# Patient Record
Sex: MALE | Race: WHITE | NOT HISPANIC OR LATINO | Employment: OTHER | URBAN - METROPOLITAN AREA
[De-identification: names, ages, dates, MRNs, and addresses within clinical notes are randomized per-mention and may not be internally consistent; named-entity substitution may affect disease eponyms.]

---

## 2019-03-09 ENCOUNTER — HOSPITAL ENCOUNTER (EMERGENCY)
Facility: HOSPITAL | Age: 44
End: 2019-03-10
Attending: EMERGENCY MEDICINE | Admitting: EMERGENCY MEDICINE

## 2019-03-09 VITALS
DIASTOLIC BLOOD PRESSURE: 107 MMHG | WEIGHT: 294.5 LBS | TEMPERATURE: 97.7 F | OXYGEN SATURATION: 98 % | SYSTOLIC BLOOD PRESSURE: 165 MMHG | HEIGHT: 72 IN | RESPIRATION RATE: 18 BRPM | HEART RATE: 74 BPM | BODY MASS INDEX: 39.89 KG/M2

## 2019-03-09 DIAGNOSIS — R45.851 SUICIDAL IDEATION: Primary | ICD-10-CM

## 2019-03-09 DIAGNOSIS — F41.9 ANXIETY: ICD-10-CM

## 2019-03-09 LAB
ALBUMIN SERPL-MCNC: 4.4 G/DL (ref 3.5–5.2)
ALBUMIN/GLOB SERPL: 1.5 G/DL
ALP SERPL-CCNC: 77 U/L (ref 39–117)
ALT SERPL W P-5'-P-CCNC: 32 U/L (ref 1–41)
AMPHET+METHAMPHET UR QL: NEGATIVE
ANION GAP SERPL CALCULATED.3IONS-SCNC: 13.7 MMOL/L
AST SERPL-CCNC: 25 U/L (ref 1–40)
BARBITURATES UR QL SCN: NEGATIVE
BASOPHILS # BLD AUTO: 0.05 10*3/MM3 (ref 0–0.2)
BASOPHILS NFR BLD AUTO: 0.4 % (ref 0–1.5)
BENZODIAZ UR QL SCN: NEGATIVE
BILIRUB SERPL-MCNC: 0.3 MG/DL (ref 0.1–1.2)
BUN BLD-MCNC: 6 MG/DL (ref 6–20)
BUN/CREAT SERPL: 9 (ref 7–25)
CALCIUM SPEC-SCNC: 9.7 MG/DL (ref 8.6–10.5)
CANNABINOIDS SERPL QL: NEGATIVE
CHLORIDE SERPL-SCNC: 101 MMOL/L (ref 98–107)
CO2 SERPL-SCNC: 23.3 MMOL/L (ref 22–29)
COCAINE UR QL: NEGATIVE
CREAT BLD-MCNC: 0.67 MG/DL (ref 0.76–1.27)
DEPRECATED RDW RBC AUTO: 40.5 FL (ref 37–54)
EOSINOPHIL # BLD AUTO: 0.04 10*3/MM3 (ref 0–0.4)
EOSINOPHIL NFR BLD AUTO: 0.4 % (ref 0.3–6.2)
ERYTHROCYTE [DISTWIDTH] IN BLOOD BY AUTOMATED COUNT: 12.7 % (ref 12.3–15.4)
ETHANOL BLD-MCNC: <10 MG/DL (ref 0–10)
ETHANOL UR QL: <0.01 %
GFR SERPL CREATININE-BSD FRML MDRD: 128 ML/MIN/1.73
GLOBULIN UR ELPH-MCNC: 3 GM/DL
GLUCOSE BLD-MCNC: 109 MG/DL (ref 65–99)
HCT VFR BLD AUTO: 40.2 % (ref 37.5–51)
HGB BLD-MCNC: 13.9 G/DL (ref 13–17.7)
IMM GRANULOCYTES # BLD AUTO: 0.03 10*3/MM3 (ref 0–0.05)
IMM GRANULOCYTES NFR BLD AUTO: 0.3 % (ref 0–0.5)
LYMPHOCYTES # BLD AUTO: 2.77 10*3/MM3 (ref 0.7–3.1)
LYMPHOCYTES NFR BLD AUTO: 24.4 % (ref 19.6–45.3)
MCH RBC QN AUTO: 30.3 PG (ref 26.6–33)
MCHC RBC AUTO-ENTMCNC: 34.6 G/DL (ref 31.5–35.7)
MCV RBC AUTO: 87.8 FL (ref 79–97)
METHADONE UR QL SCN: NEGATIVE
MONOCYTES # BLD AUTO: 0.94 10*3/MM3 (ref 0.1–0.9)
MONOCYTES NFR BLD AUTO: 8.3 % (ref 5–12)
NEUTROPHILS # BLD AUTO: 7.53 10*3/MM3 (ref 1.4–7)
NEUTROPHILS NFR BLD AUTO: 66.2 % (ref 42.7–76)
NRBC BLD AUTO-RTO: 0 /100 WBC (ref 0–0)
OPIATES UR QL: NEGATIVE
OXYCODONE UR QL SCN: NEGATIVE
PLATELET # BLD AUTO: 252 10*3/MM3 (ref 140–450)
PMV BLD AUTO: 9.5 FL (ref 6–12)
POTASSIUM BLD-SCNC: 3.4 MMOL/L (ref 3.5–5.2)
PROT SERPL-MCNC: 7.4 G/DL (ref 6–8.5)
RBC # BLD AUTO: 4.58 10*6/MM3 (ref 4.14–5.8)
SODIUM BLD-SCNC: 138 MMOL/L (ref 136–145)
WBC NRBC COR # BLD: 11.36 10*3/MM3 (ref 3.4–10.8)

## 2019-03-09 PROCEDURE — 84443 ASSAY THYROID STIM HORMONE: CPT | Performed by: SPECIALIST

## 2019-03-09 PROCEDURE — 81003 URINALYSIS AUTO W/O SCOPE: CPT | Performed by: SPECIALIST

## 2019-03-09 PROCEDURE — 99285 EMERGENCY DEPT VISIT HI MDM: CPT

## 2019-03-09 PROCEDURE — 80307 DRUG TEST PRSMV CHEM ANLYZR: CPT | Performed by: PHYSICIAN ASSISTANT

## 2019-03-09 PROCEDURE — 84439 ASSAY OF FREE THYROXINE: CPT | Performed by: SPECIALIST

## 2019-03-09 PROCEDURE — 85025 COMPLETE CBC W/AUTO DIFF WBC: CPT | Performed by: PHYSICIAN ASSISTANT

## 2019-03-09 PROCEDURE — 80053 COMPREHEN METABOLIC PANEL: CPT | Performed by: PHYSICIAN ASSISTANT

## 2019-03-09 RX ORDER — RISPERIDONE 1 MG/1
1 TABLET ORAL DAILY
COMMUNITY
End: 2019-03-15 | Stop reason: HOSPADM

## 2019-03-10 ENCOUNTER — HOSPITAL ENCOUNTER (INPATIENT)
Facility: HOSPITAL | Age: 44
LOS: 5 days | Discharge: HOME | End: 2019-03-15
Attending: SPECIALIST | Admitting: SPECIALIST

## 2019-03-10 PROBLEM — F33.1 MAJOR DEPRESSIVE DISORDER, RECURRENT, MODERATE (HCC): Status: ACTIVE | Noted: 2019-03-10

## 2019-03-10 LAB
ALBUMIN SERPL-MCNC: 4.3 G/DL (ref 3.5–5.2)
ALBUMIN/GLOB SERPL: 1.5 G/DL
ALP SERPL-CCNC: 74 U/L (ref 39–117)
ALT SERPL W P-5'-P-CCNC: 30 U/L (ref 1–41)
ANION GAP SERPL CALCULATED.3IONS-SCNC: 11.1 MMOL/L
AST SERPL-CCNC: 22 U/L (ref 1–40)
BASOPHILS # BLD AUTO: 0.05 10*3/MM3 (ref 0–0.2)
BASOPHILS NFR BLD AUTO: 0.5 % (ref 0–1.5)
BILIRUB SERPL-MCNC: 0.3 MG/DL (ref 0.1–1.2)
BILIRUB UR QL STRIP: NEGATIVE
BUN BLD-MCNC: 7 MG/DL (ref 6–20)
BUN/CREAT SERPL: 9.9 (ref 7–25)
CALCIUM SPEC-SCNC: 9.7 MG/DL (ref 8.6–10.5)
CHLORIDE SERPL-SCNC: 101 MMOL/L (ref 98–107)
CLARITY UR: ABNORMAL
CO2 SERPL-SCNC: 28.9 MMOL/L (ref 22–29)
COLOR UR: YELLOW
CREAT BLD-MCNC: 0.71 MG/DL (ref 0.76–1.27)
DEPRECATED RDW RBC AUTO: 41.3 FL (ref 37–54)
EOSINOPHIL # BLD AUTO: 0.06 10*3/MM3 (ref 0–0.4)
EOSINOPHIL NFR BLD AUTO: 0.6 % (ref 0.3–6.2)
ERYTHROCYTE [DISTWIDTH] IN BLOOD BY AUTOMATED COUNT: 12.7 % (ref 12.3–15.4)
GFR SERPL CREATININE-BSD FRML MDRD: 120 ML/MIN/1.73
GLOBULIN UR ELPH-MCNC: 2.8 GM/DL
GLUCOSE BLD-MCNC: 104 MG/DL (ref 65–99)
GLUCOSE UR STRIP-MCNC: NEGATIVE MG/DL
HCT VFR BLD AUTO: 40.2 % (ref 37.5–51)
HGB BLD-MCNC: 13.4 G/DL (ref 13–17.7)
HGB UR QL STRIP.AUTO: NEGATIVE
IMM GRANULOCYTES # BLD AUTO: 0.08 10*3/MM3 (ref 0–0.05)
IMM GRANULOCYTES NFR BLD AUTO: 0.8 % (ref 0–0.5)
KETONES UR QL STRIP: ABNORMAL
LEUKOCYTE ESTERASE UR QL STRIP.AUTO: NEGATIVE
LYMPHOCYTES # BLD AUTO: 2.91 10*3/MM3 (ref 0.7–3.1)
LYMPHOCYTES NFR BLD AUTO: 27.6 % (ref 19.6–45.3)
MCH RBC QN AUTO: 29.7 PG (ref 26.6–33)
MCHC RBC AUTO-ENTMCNC: 33.3 G/DL (ref 31.5–35.7)
MCV RBC AUTO: 89.1 FL (ref 79–97)
MONOCYTES # BLD AUTO: 1.07 10*3/MM3 (ref 0.1–0.9)
MONOCYTES NFR BLD AUTO: 10.2 % (ref 5–12)
NEUTROPHILS # BLD AUTO: 6.36 10*3/MM3 (ref 1.4–7)
NEUTROPHILS NFR BLD AUTO: 60.3 % (ref 42.7–76)
NITRITE UR QL STRIP: NEGATIVE
NRBC BLD AUTO-RTO: 0 /100 WBC (ref 0–0)
PH UR STRIP.AUTO: 5.5 [PH] (ref 5–8)
PLATELET # BLD AUTO: 256 10*3/MM3 (ref 140–450)
PMV BLD AUTO: 9.6 FL (ref 6–12)
POTASSIUM BLD-SCNC: 3.6 MMOL/L (ref 3.5–5.2)
PROT SERPL-MCNC: 7.1 G/DL (ref 6–8.5)
PROT UR QL STRIP: ABNORMAL
RBC # BLD AUTO: 4.51 10*6/MM3 (ref 4.14–5.8)
SODIUM BLD-SCNC: 141 MMOL/L (ref 136–145)
SP GR UR STRIP: 1.01 (ref 1–1.03)
T4 FREE SERPL-MCNC: 1.49 NG/DL (ref 0.93–1.7)
TSH SERPL DL<=0.05 MIU/L-ACNC: 1.77 MIU/ML (ref 0.27–4.2)
UROBILINOGEN UR QL STRIP: ABNORMAL
WBC NRBC COR # BLD: 10.53 10*3/MM3 (ref 3.4–10.8)

## 2019-03-10 PROCEDURE — 80053 COMPREHEN METABOLIC PANEL: CPT | Performed by: SPECIALIST

## 2019-03-10 PROCEDURE — 85025 COMPLETE CBC W/AUTO DIFF WBC: CPT | Performed by: SPECIALIST

## 2019-03-10 RX ORDER — RISPERIDONE 1 MG/1
1 TABLET ORAL DAILY
Status: DISCONTINUED | OUTPATIENT
Start: 2019-03-10 | End: 2019-03-10

## 2019-03-10 RX ORDER — THIAMINE MONONITRATE (VIT B1) 100 MG
100 TABLET ORAL DAILY
Status: DISCONTINUED | OUTPATIENT
Start: 2019-03-10 | End: 2019-03-15 | Stop reason: HOSPADM

## 2019-03-10 RX ORDER — NICOTINE 21 MG/24HR
1 PATCH, TRANSDERMAL 24 HOURS TRANSDERMAL EVERY 24 HOURS
Status: DISCONTINUED | OUTPATIENT
Start: 2019-03-10 | End: 2019-03-10

## 2019-03-10 RX ORDER — ALUMINA, MAGNESIA, AND SIMETHICONE 2400; 2400; 240 MG/30ML; MG/30ML; MG/30ML
15 SUSPENSION ORAL EVERY 6 HOURS PRN
Status: DISCONTINUED | OUTPATIENT
Start: 2019-03-10 | End: 2019-03-15 | Stop reason: HOSPADM

## 2019-03-10 RX ORDER — LAMOTRIGINE 25 MG/1
25 TABLET ORAL DAILY
Status: DISCONTINUED | OUTPATIENT
Start: 2019-03-10 | End: 2019-03-14

## 2019-03-10 RX ORDER — PANTOPRAZOLE SODIUM 40 MG/1
40 TABLET, DELAYED RELEASE ORAL
Status: DISCONTINUED | OUTPATIENT
Start: 2019-03-11 | End: 2019-03-15 | Stop reason: HOSPADM

## 2019-03-10 RX ORDER — OLANZAPINE 5 MG/1
5 TABLET ORAL 3 TIMES DAILY
Status: DISCONTINUED | OUTPATIENT
Start: 2019-03-10 | End: 2019-03-10

## 2019-03-10 RX ORDER — HYDROXYZINE PAMOATE 25 MG/1
50 CAPSULE ORAL EVERY 6 HOURS PRN
Status: DISCONTINUED | OUTPATIENT
Start: 2019-03-10 | End: 2019-03-15 | Stop reason: HOSPADM

## 2019-03-10 RX ORDER — ACETAMINOPHEN 325 MG/1
650 TABLET ORAL EVERY 4 HOURS PRN
Status: DISCONTINUED | OUTPATIENT
Start: 2019-03-10 | End: 2019-03-15 | Stop reason: HOSPADM

## 2019-03-10 RX ORDER — AMLODIPINE BESYLATE 5 MG/1
5 TABLET ORAL DAILY
Status: DISCONTINUED | OUTPATIENT
Start: 2019-03-10 | End: 2019-03-11

## 2019-03-10 RX ORDER — BUPROPION HYDROCHLORIDE 150 MG/1
150 TABLET ORAL DAILY
Status: DISCONTINUED | OUTPATIENT
Start: 2019-03-10 | End: 2019-03-15 | Stop reason: HOSPADM

## 2019-03-10 RX ORDER — RISPERIDONE 1 MG/1
1 TABLET ORAL NIGHTLY
Status: DISCONTINUED | OUTPATIENT
Start: 2019-03-11 | End: 2019-03-11

## 2019-03-10 RX ADMIN — Medication 100 MG: at 15:14

## 2019-03-10 RX ADMIN — LAMOTRIGINE 25 MG: 25 TABLET ORAL at 15:13

## 2019-03-10 RX ADMIN — NICOTINE 1 PATCH: 21 PATCH, EXTENDED RELEASE TRANSDERMAL at 10:12

## 2019-03-10 RX ADMIN — HYDROXYZINE PAMOATE 50 MG: 25 CAPSULE ORAL at 22:38

## 2019-03-10 RX ADMIN — AMLODIPINE BESYLATE 5 MG: 5 TABLET ORAL at 15:14

## 2019-03-10 RX ADMIN — NICOTINE POLACRILEX 4 MG: 4 GUM, CHEWING BUCCAL at 21:45

## 2019-03-10 NOTE — ED NOTES
"Pt reports \"hx of anxiety, anxiety has worsened the past few days but has been the worse today. Unable to sleep x2 days, hearing voices for about a day. Pt denies ETOH or drug use. Pt reports thoughts of wanting to hurt himself today, plan is to jump off bridge.\"     Manisha Mac RN  03/09/19 2126    "

## 2019-03-10 NOTE — H&P
History and physical    Primary CARE physician  Dr. RODGERS    Chief complaint   Severe depression with suicidal ideation    History of present illness  43-year-old white male with history of hypertension bipolar disorder alcohol abuse tobacco abuse admitted to inpatient psych unit with severe depression and suicidal ideation.  I am asked to follow the patient for medical problem and with a history and physical.  At the time of interview he still thinking about suicide but he has never committed suicide and he still depressed.  Patient denies any headache dizziness chest pain shortness of breath abdominal pain nausea vomiting diarrhea.  Patient also denies any fever chills night sweats weight loss or weight gain.    PAST MEDICAL HISTORY  • Alcohol abuse     • Bipolar affective (CMS/[facility])     • Hypertension         PAST SURGICAL HISTORY  Surgical History   History reviewed. No pertinent surgical history.     FAMILY HISTORY        Family History   Problem Relation Age of Onset   • Bipolar disorder Sister     • Bipolar disorder Brother     • Alcohol abuse Brother        SOCIAL HISTORY  Social History               Socioeconomic History   • Marital status:        Spouse name: Not on file   • Number of children: Not on file   • Years of education: Not on file   • Highest education level: Not on file   Social Needs   • Financial resource strain: Not on file   • Food insecurity - worry: Not on file   • Food insecurity - inability: Not on file   • Transportation needs - medical: Not on file   • Transportation needs - non-medical: Not on file   Occupational History   • Not on file   Tobacco Use   • Smoking status: Heavy Tobacco Smoker       Packs/day: 1.00       Types: Cigarettes   • Smokeless tobacco: Current User   Substance and Sexual Activity   • Alcohol use: Yes       Comment: socially; pt reports last drink was 2 beers last night   • Drug use: No   • Sexual activity: Not on file   Other Topics Concern   • Not on  file   Social History Narrative   • Not on file         ALLERGIES  Patient has no known allergies.  Home medications reviewed     REVIEW OF SYSTEMS  Review of Systems   Constitutional: Negative for fever.   Respiratory: Negative for shortness of breath.    Cardiovascular: Negative for chest pain.   Genitourinary: Negative for dysuria.   Neurological: Negative for light-headedness.   Psychiatric/Behavioral: Positive for sleep disturbance (insomnia) and suicidal ideas. The patient is nervous/anxious.         + depressed mood       PHYSICAL EXAM  Blood pressure 135/82, pulse 97, temperature 97.6 °F (36.4 °C), temperature source Oral, resp. rate 20, SpO2 98 %.    Constitutional: No distress.   Head: Normocephalic and atraumatic.   Eyes: EOM are normal.   Neck: Normal range of motion.   Pulmonary/Chest: No respiratory distress.   Abdominal: There is no tenderness.   Musculoskeletal: He exhibits no edema.   Neurological: He is alert.   Skin: Skin is warm and dry.   Psychiatric: He has a flat affect.     LAB RESULTS  Lab Results (last 24 hours)     Procedure Component Value Units Date/Time    Comprehensive Metabolic Panel [[id number]]  (Abnormal) Collected:  03/10/19 0602    Specimen:  Blood Updated:  03/10/19 0655     Glucose 104 mg/dL      BUN 7 mg/dL      Creatinine 0.71 mg/dL      Sodium 141 mmol/L      Potassium 3.6 mmol/L      Chloride 101 mmol/L      CO2 28.9 mmol/L      Calcium 9.7 mg/dL      Total Protein 7.1 g/dL      Albumin 4.30 g/dL      ALT (SGPT) 30 U/L      AST (SGOT) 22 U/L      Alkaline Phosphatase 74 U/L      Total Bilirubin 0.3 mg/dL      eGFR Non African Amer 120 mL/min/1.73      Globulin 2.8 gm/dL      A/G Ratio 1.5 g/dL      BUN/Creatinine Ratio 9.9     Anion Gap 11.1 mmol/L     Narrative:       GFR Normal >60  Chronic Kidney Disease <60  Kidney Failure <15    CBC & Differential [[id number]] Collected:  03/10/19 0602    Specimen:  Blood Updated:  03/10/19 0637    Narrative:       The following orders  were created for panel order CBC & Differential.  Procedure                               Abnormality         Status                     ---------                               -----------         ------                     CBC Auto Differential[[id number]]        Abnormal            Final result                 Please view results for these tests on the individual orders.    CBC Auto Differential [[id number]]  (Abnormal) Collected:  03/10/19 0602    Specimen:  Blood Updated:  03/10/19 0637     WBC 10.53 10*3/mm3      RBC 4.51 10*6/mm3      Hemoglobin 13.4 g/dL      Hematocrit 40.2 %      MCV 89.1 fL      MCH 29.7 pg      MCHC 33.3 g/dL      RDW 12.7 %      RDW-SD 41.3 fl      MPV 9.6 fL      Platelets 256 10*3/mm3      Neutrophil % 60.3 %      Lymphocyte % 27.6 %      Monocyte % 10.2 %      Eosinophil % 0.6 %      Basophil % 0.5 %      Immature Grans % 0.8 %      Neutrophils, Absolute 6.36 10*3/mm3      Lymphocytes, Absolute 2.91 10*3/mm3      Monocytes, Absolute 1.07 10*3/mm3      Eosinophils, Absolute 0.06 10*3/mm3      Basophils, Absolute 0.05 10*3/mm3      Immature Grans, Absolute 0.08 10*3/mm3      nRBC 0.0 /100 WBC     TSH [[id number]]  (Normal) Collected:  03/09/19 2143    Specimen:  Blood Updated:  03/10/19 0359     TSH 1.770 mIU/mL     T4, Free [[id number]]  (Normal) Collected:  03/09/19 2143    Specimen:  Blood Updated:  03/10/19 0359     Free T4 1.49 ng/dL     Urinalysis With Microscopic If Indicated (No Culture) - Urine, Clean Catch [[id number]]  (Abnormal) Collected:  03/09/19 2247    Specimen:  Urine, Clean Catch Updated:  03/10/19 0325     Color, UA Yellow     Appearance, UA Cloudy     pH, UA 5.5     Specific Gravity, UA 1.015     Glucose, UA Negative     Ketones, UA Trace     Bilirubin, UA Negative     Blood, UA Negative     Protein, UA Trace     Leuk Esterase, UA Negative     Nitrite, UA Negative     Urobilinogen, UA 0.2 E.U./dL    Narrative:       Urine microscopic not indicated.        Imaging Results  (last 24 hours)     ** No results found for the last 24 hours. **          Current Facility-Administered Medications:   •  acetaminophen (TYLENOL) tablet 650 mg, 650 mg, Oral, Q4H PRN, Arley Moreno III, MD  •  aluminum-magnesium hydroxide-simethicone (MAALOX MAX) 400-400-40 MG/5ML suspension 15 mL, 15 mL, Oral, Q6H PRN, Arley Moreno III, MD  •  amLODIPine (NORVASC) tablet 5 mg, 5 mg, Oral, Daily, Shelly Dumont MD  •  buPROPion XL (WELLBUTRIN XL) 24 hr tablet 150 mg, 150 mg, Oral, Daily, Shelly Dumont MD  •  lamoTRIgine (LaMICtal) tablet 25 mg, 25 mg, Oral, Daily, Shelly Dumont MD  •  magnesium hydroxide (MILK OF MAGNESIA) suspension 2400 mg/10mL 10 mL, 10 mL, Oral, Daily PRN, Arley Moreno III, MD  •  nicotine (NICODERM CQ) 21 MG/24HR patch 1 patch, 1 patch, Transdermal, Q24H, Arley Moreno III, MD, 1 patch at 03/10/19 1012  •  OLANZapine (zyPREXA) tablet 5 mg, 5 mg, Oral, TID, Shelly Dumont MD  •  [START ON 3/11/2019] pantoprazole (PROTONIX) EC tablet 40 mg, 40 mg, Oral, Q AM, Shelly Dumont MD  •  [START ON 3/11/2019] risperiDONE (risperDAL) tablet 1 mg, 1 mg, Oral, Nightly, Arley Moreno III, MD     ASSESSMENT  Severe depression with suicidal ideation per psychiatry  Bipolar disorder  Anxiety disorder  Depression  Hypertension  Tobacco abuse  Alcohol abuse  Gastroesophageal reflux disease    PLAN  Agree with current care  Resume home medications if okay with   Stress ulcer prophylaxis  No need for DVT prophylaxis  Supportive care  Patient is full code  We will follow with Dr. NEIL COLMENARES and further recommendation according to hospital course    SHELLY DUMONT MD

## 2019-03-10 NOTE — PROGRESS NOTES
"IDENTIFYING INFORMATION: The patient is a 43-year-old white male admitted after he presented to this facility voicing positive suicidal ideation.    CHIEF COMPLAINT: I want to go to the [place]    INFORMANT: Patient and chart    RELIABILITY: Fair    HISTORY OF PRESENT ILLNESS: The patient is a 43-year-old white male with a history of bipolar disorder complicated by poor compliance with medication and a history of alcohol abuse.  He was brought to this facility by family members yesterday per reporting positive suicidal ideation.  He had reportedly recently moved in with his sister after living with his mother following the divorce.  His mother's health is failing per the patient's report.  The patient is followed by Dr. Chaz Mtz at [facility].  He admits that he has been poorly compliant with medications recently.  When seen today the patient is able to promise safety in the hospital but remains hopeless and continues to endorse positive suicidal ideation while at the same time stating wish to \"go to [place]\" for reasons which seem unclear.  The patient is chronically mentally ill and does not work outside the home.    PAST PSYCHIATRIC HISTORY: The patient has history of multiple previous psychiatric hospitalizations    PAST MEDICAL HISTORY: The patient is obese and suffers from hypertension    MEDICATIONS:   Current Facility-Administered Medications   Medication Dose Route Frequency Provider Last Rate Last Dose   • acetaminophen (TYLENOL) tablet 650 mg  650 mg Oral Q4H PRN Arley Moreno III, MD       • aluminum-magnesium hydroxide-simethicone (MAALOX MAX) 400-400-40 MG/5ML suspension 15 mL  15 mL Oral Q6H PRN Arley Moreno III, MD       • amLODIPine (NORVASC) tablet 5 mg  5 mg Oral Daily Velasquez Dumont MD       • buPROPion XL (WELLBUTRIN XL) 24 hr tablet 150 mg  150 mg Oral Daily Velasquez Dumont MD       • lamoTRIgine (LaMICtal) tablet 25 mg  25 mg Oral Daily " Velasquez Dumont MD       • magnesium hydroxide (MILK OF MAGNESIA) suspension 2400 mg/10mL 10 mL  10 mL Oral Daily PRN Arley Moreno III, MD       • nicotine (NICODERM CQ) 21 MG/24HR patch 1 patch  1 patch Transdermal Q24H Arley Moreno III, MD   1 patch at 03/10/19 1012   • [START ON 3/11/2019] pantoprazole (PROTONIX) EC tablet 40 mg  40 mg Oral Q AM Velasquez Dumont MD       • [START ON 3/11/2019] risperiDONE (risperDAL) tablet 1 mg  1 mg Oral Nightly Arley Moreno III, MD       • thiamine (VITAMIN B-1) tablet 100 mg  100 mg Oral Daily Velasquez Dumont MD             ALLERGIES: None    FAMILY HISTORY: Noncontributory    SOCIAL HISTORY: The patient is currently living with his sister.  Because of his chronic psychiatric illness he is not employed.  He has a history of alcohol abuse but denies recent abuse of any psychoactive substances.    MENTAL STATUS EXAM: The patient is an obese white male appearing stated age.  He has no apparent physical distress at the time of the examination.  He is awake alert and oriented all spheres.  His mood is mildly dysphoric his affect constricted.  Speech is relevant coherent.  There are no deficits memory cognition noted.  Intelligence is judged in the average range based on fund of knowledge, the patient is cooperative throughout interview.  He continues to endorse positive suicidal ideation but is able to promise safety in the hospital.  He denies homicidal ideation or psychotic symptoms.  His judgment and insight appear to be reasonably intact.    ASSETS/LIABILITIES: Supportive family/poor compliance with treatment    DIAGNOSTIC IMPRESSION: Bipolar disorder depressed phase, hypertension, obesity, GERD    PLAN: The patient remains hospitalized for safety and stabilization.  Unfortunately we will have to re-titrate Lamictal given the patient's history of poor compliance with this medication.  The patient is able to promise safety in the hospital on  suicide precautions will be reduced to low risk.  We will also continue recently initiated Risperdal and Wellbutrin.  A family therapy session will be requested.  Estimated length of stay in the hospital 7-10 days.

## 2019-03-10 NOTE — PLAN OF CARE
Problem: Patient Care Overview  Goal: Plan of Care Review  Outcome: Ongoing (interventions implemented as appropriate)   03/10/19 0315 03/10/19 0534   OTHER   Outcome Summary --  Pt arrived to unit approximately 0130. Pt cooperative with assessment upon arrival. No scheduled medications to administer. Pt continues to voice positive suicidal ideation but has no active plan. Remains 1-1 with sitter. Will continue to monitor.   Plan of Care Review   Progress --  no change   Coping/Psychosocial   Plan of Care Reviewed With patient --    Coping/Psychosocial   Patient Agreement with Plan of Care agrees --        Problem: Overarching Goals (Adult)  Goal: Adheres to Safety Considerations for Self and Others  Outcome: Ongoing (interventions implemented as appropriate)   03/10/19 0534   Overarching Goals (Adult)   Adheres to Safety Considerations for Self and Others making progress toward outcome     Intervention: Develop and Maintain Individualized Safety Plan   03/10/19 0315 03/10/19 0400   C-SSRS (Recent)   Wish to be Dead yes --    Suicidal Thoughts no --    Suicidal Thought with Method No Plan/Intent no --    Suicidal Intent (without Specific Plan) no --    Suicide Intent with Specific Plan no --    Describe Plan (Suicide Intent) no --    Suicide Behavior no --    Violence Risk   Feels Like Hurting Others no --    Previous Attempt to Harm Others no --    Develop and Maintain Individualized Safety Plan   Safety Measures --  safety rounds completed       Goal: Optimized Coping Skills in Response to Life Stressors  Outcome: Ongoing (interventions implemented as appropriate)   03/10/19 0534   Overarching Goals (Adult)   Optimized Coping Skills in Response to Life Stressors making progress toward outcome     Intervention: Promote Effective Coping Strategies   03/10/19 0315   Coping/Psychosocial Interventions   Supportive Measures active listening utilized;verbalization of feelings encouraged       Goal: Develops/Participates  in Therapeutic [place] to Support Successful Transition  Outcome: Ongoing (interventions implemented as appropriate)   03/10/19 0534   Overarching Goals (Adult)   Develops/Participates in Therapeutic [place] to Support Successful Transition making progress toward outcome     Intervention: Foster Therapeutic [place]   03/10/19 0315   Interventions   Trust Relationship/Rapport care explained;thoughts/feelings acknowledged     Intervention: Mutually Develop Transition Plan   03/10/19 0315   Mutually Develop Transition Plan   Transition Support crisis management plan promoted         Problem: Suicidal Behavior (Adult)  Goal: Suicidal Behavior is Absent/Minimized/Managed  Outcome: Ongoing (interventions implemented as appropriate)   03/10/19 0534   Suicidal Behavior is Absent/Minimized/Managed   Suicidal Behavior Managed/Minimized Time Frame for Action Step (STG) 3 days   Suicidal Behavior Managed/Minimized Action Step (STG) Outcome making progress toward outcome

## 2019-03-10 NOTE — CONSULTS
"Pt is 42 yo male seen in ED for suicidal thoughts with a plan to \"jump off a bridge\".  Pt has a flat affect and disheveled appearance.  He has poor eye contact.  I interviewed him in room with sister, Lis, present at pt request.  Pt recently moved in with sister after living with his Mother following his divorce.  Both pt and sister state they have a good relationship.      Pt reports he started \"hearing thoughts in my head\" telling him to kill himself and that these thoughts started yesterday.  He cannot name any specific stressor, but that \"they just started\".  He reports that \"they sound like voices telling me to do things\".  When asked what type of things the voices tell him, he stated \"kill myself, hurt myself\".  He reports that he was hearing these voices during the time of this interview.      Pt reports that he sees Dr. Mtz outpatient, with his most recent visit being \"about 2 months ago\" and pt was placed on Risperdal 1mg at that time.  Pt states the medication has \"not been working\". Pt reports that he has been on multiple medications in past, but \"it's hard to remember\" to take them.  Pt states he is on disability for the past 18 years for \"bipolar disorder\".      When asked if he could be safe if he left the hospital, he stated \"no, I will kill myself\".  Also asked if he would be safe in hospital, and he said \"maybe, I don't know\".  He also asked sister during interview to promise \"to give me a good burial\".  He made this statement several times to his sister, until she agreed that she would.    Spoke with Dr Moreno and pt is well known to him.  Discussed with Dr. Moreno that ED provider, Dr. Lerma, did not want to discharge pt r/t suicidal ideation with a plan and fear that pt would not be safe.  Dr. Moreno ordered pt to be admitted to [facility] and to have 1:1 staff for pt safety.   Dr. Lerma agreeable with plan to admit.  "

## 2019-03-10 NOTE — ED NOTES
"Pt to ED via [facility]. Pt c/o anxiety attack and \"hearing voices in my head\" that started yesterday.     Pt on risperidone 1mg daily.     Pt denies SI/HI. Pt denies ETOH or drug use.      Carlie Covarrubias, RN  03/09/19 8823    "

## 2019-03-10 NOTE — ED PROVIDER NOTES
"MD ATTESTATION NOTE    The [facility] and I have discussed this patient's history, physical exam, and treatment plan.  I have reviewed the documentation and personally had a face to face interaction with the patient. I affirm the documentation and agree with the treatment and plan.  The attached note describes my personal findings.    Pt with worsening SI over the past several days. Pt also reports some worsening depression. He reports thoughts of \"wanting to jump off a bridge.\" Pt states that he quit taking his medication 3 days ago.     On exam, pt is A&Ox3 and in NAD. He appears anxious. Heart is RRR and lungs are CTAB.     Pt has been seen and evaluated by [facility]. Family expresses concern about the original plan to DC.  Still insist he is a danger to himself and patient is still saying he wants to kill himself. I spoke with again with [facility] who spoke with Dr. Moreno, who agrees to admit.    Documentation assistance provided by ana Brewer for Dr. Lerma.  Information recorded by the scribe was done at my direction and has been verified and validated by me.         Deven Brewer  03/10/19 0009       Kyle Lerma MD  03/10/19 2038    "

## 2019-03-10 NOTE — ED NOTES
Okay for pt to take home dose of Risperidone per ANGEL Johnson.      Julia Hooper, RN  03/10/19 0010

## 2019-03-10 NOTE — ED PROVIDER NOTES
" EMERGENCY DEPARTMENT ENCOUNTER    CHIEF COMPLAINT  Chief Complaint: suicidal ideations  History given by: patient, sister  History limited by: none  Room Number: 26/26  PMD: Michele Hernandez MD      HPI:  Pt is a 43 y.o. male who presents with sister bedside complaining of anxiety and depression X 2 days. He has associated insomnia and SI w/o a previous hx. He advised he started to have \"wild thoughts\" go through his head about harming himself. He denies illicit drug use, and admits to being a smoker and uses ETOH. He denies CP, SOA, and other complaints. He has hx of HTN, but is not taking medications.     PAST MEDICAL HISTORY  Active Ambulatory Problems     Diagnosis Date Noted   • Psychosis (CMS/HCC) 04/10/2016   • Benign essential HTN 04/11/2016   • Headache 04/11/2016   • Tobacco abuse 04/11/2016   • Alcohol abuse 04/11/2016     Resolved Ambulatory Problems     Diagnosis Date Noted   • No Resolved Ambulatory Problems     Past Medical History:   Diagnosis Date   • Alcohol abuse    • Bipolar affective (CMS/HCC)    • Hypertension        PAST SURGICAL HISTORY  History reviewed. No pertinent surgical history.    FAMILY HISTORY  Family History   Problem Relation Age of Onset   • Bipolar disorder Sister    • Bipolar disorder Brother    • Alcohol abuse Brother        SOCIAL HISTORY  Social History     Socioeconomic History   • Marital status:      Spouse name: Not on file   • Number of children: Not on file   • Years of education: Not on file   • Highest education level: Not on file   Social Needs   • Financial resource strain: Not on file   • Food insecurity - worry: Not on file   • Food insecurity - inability: Not on file   • Transportation needs - medical: Not on file   • Transportation needs - non-medical: Not on file   Occupational History   • Not on file   Tobacco Use   • Smoking status: Heavy Tobacco Smoker     Packs/day: 1.00     Types: Cigarettes   • Smokeless tobacco: Current User   Substance and Sexual " Activity   • Alcohol use: Yes     Comment: socially; pt reports last drink was 2 beers last night   • Drug use: No   • Sexual activity: Not on file   Other Topics Concern   • Not on file   Social History Narrative   • Not on file       ALLERGIES  Patient has no known allergies.    REVIEW OF SYSTEMS  Review of Systems   Constitutional: Negative for fever.   Respiratory: Negative for shortness of breath.    Cardiovascular: Negative for chest pain.   Genitourinary: Negative for dysuria.   Neurological: Negative for light-headedness.   Psychiatric/Behavioral: Positive for sleep disturbance (insomnia) and suicidal ideas. The patient is nervous/anxious.         + depressed mood       PHYSICAL EXAM  ED Triage Vitals   Temp Heart Rate Resp BP SpO2   03/09/19 2104 03/09/19 2104 03/09/19 2104 03/09/19 2121 03/09/19 2104   97.7 °F (36.5 °C) 95 18 165/99 98 %      Temp src Heart Rate Source Patient Position BP Location FiO2 (%)   03/09/19 2104 -- -- -- --   Tympanic           Physical Exam   Constitutional: No distress.   HENT:   Head: Normocephalic and atraumatic.   Eyes: EOM are normal.   Neck: Normal range of motion.   Pulmonary/Chest: No respiratory distress.   Abdominal: There is no tenderness.   Musculoskeletal: He exhibits no edema.   Neurological: He is alert.   Skin: Skin is warm and dry.   Psychiatric: He has a flat affect.   Nursing note and vitals reviewed.      LAB RESULTS  Lab Results (last 24 hours)     Procedure Component Value Units Date/Time    CBC & Differential [[id number]] Collected:  03/09/19 2143    Specimen:  Blood Updated:  03/09/19 2153    Narrative:       The following orders were created for panel order CBC & Differential.  Procedure                               Abnormality         Status                     ---------                               -----------         ------                     CBC Auto Differential[[id number]]         Abnormal            Final result                 Please view results  for these tests on the individual orders.    Comprehensive Metabolic Panel [[id number]]  (Abnormal) Collected:  03/09/19 2143    Specimen:  Blood Updated:  03/09/19 2213     Glucose 109 mg/dL      BUN 6 mg/dL      Creatinine 0.67 mg/dL      Sodium 138 mmol/L      Potassium 3.4 mmol/L      Chloride 101 mmol/L      CO2 23.3 mmol/L      Calcium 9.7 mg/dL      Total Protein 7.4 g/dL      Albumin 4.40 g/dL      ALT (SGPT) 32 U/L      AST (SGOT) 25 U/L      Alkaline Phosphatase 77 U/L      Total Bilirubin 0.3 mg/dL      eGFR Non African Amer 128 mL/min/1.73      Globulin 3.0 gm/dL      A/G Ratio 1.5 g/dL      BUN/Creatinine Ratio 9.0     Anion Gap 13.7 mmol/L     Narrative:       GFR Normal >60  Chronic Kidney Disease <60  Kidney Failure <15    Ethanol [[id number]] Collected:  03/09/19 2143    Specimen:  Blood Updated:  03/09/19 2213     Ethanol <10 mg/dL      Ethanol % <0.010 %     CBC Auto Differential [[id number]]  (Abnormal) Collected:  03/09/19 2143    Specimen:  Blood Updated:  03/09/19 2153     WBC 11.36 10*3/mm3      RBC 4.58 10*6/mm3      Hemoglobin 13.9 g/dL      Hematocrit 40.2 %      MCV 87.8 fL      MCH 30.3 pg      MCHC 34.6 g/dL      RDW 12.7 %      RDW-SD 40.5 fl      MPV 9.5 fL      Platelets 252 10*3/mm3      Neutrophil % 66.2 %      Lymphocyte % 24.4 %      Monocyte % 8.3 %      Eosinophil % 0.4 %      Basophil % 0.4 %      Immature Grans % 0.3 %      Neutrophils, Absolute 7.53 10*3/mm3      Lymphocytes, Absolute 2.77 10*3/mm3      Monocytes, Absolute 0.94 10*3/mm3      Eosinophils, Absolute 0.04 10*3/mm3      Basophils, Absolute 0.05 10*3/mm3      Immature Grans, Absolute 0.03 10*3/mm3      nRBC 0.0 /100 WBC     Urine Drug Screen - Urine, Clean Catch [[id number]]  (Normal) Collected:  03/09/19 2247    Specimen:  Urine, Clean Catch Updated:  03/09/19 2318     Amphet/Methamphet, Screen Negative     Barbiturates Screen, Urine Negative     Benzodiazepine Screen, Urine Negative     Cocaine Screen, Urine Negative      Opiate Screen Negative     THC, Screen, Urine Negative     Methadone Screen, Urine Negative     Oxycodone Screen, Urine Negative    Narrative:       Negative Thresholds For Drugs Screened:     Amphetamines               500 ng/ml   Barbiturates               200 ng/ml   Benzodiazepines            100 ng/ml   Cocaine                    300 ng/ml   Methadone                  300 ng/ml   Opiates                    300 ng/ml   Oxycodone                  100 ng/ml   THC                        50 ng/ml    The Normal Value for all drugs tested is negative. This report includes final unconfirmed screening results to be used for medical treatment purposes only. Unconfirmed results must not be used for non-medical purposes such as employment or legal testing. Clinical consideration should be applied to any drug of abuse test, particulary when unconfirmed results are used.          I ordered the above labs and reviewed the results    PROCEDURES  Procedures      PROGRESS AND CONSULTS     2239  Discussed the pt w/ David Alvarez RN. She has been to assess the pt and advised that the pt is hearing voices telling him to jump off a bridge and to kill himself as well as stated that if he leaves the hospital he will kill himself. She is going to consult w/ Dr. Moreno, psychiatrist, to admit.    2243  She advised she consulted w/ Dr. Moreno and he does not want to admit the pt.     2346  ED tech advised that the pt wants to leave if he is not going to receive any medications for his anxiety he states that he says that he has.    2348  Discussed the pt w/ Dr. Lerma, ED provider. After performing his own physical exam, he agrees w/ the plan of care. He spoke w/ the pt about the consult w/ Dr. Moreno. Pt's family advised that the pt has been off their medications for several days and cannot take the pt home and be responsible for his safety. Pt continues to report to the provider that he will kill himself if he leaves  the facility. Contacted Access RN back to have them speak to Dr. Moreno.    2355  Reviewed pt's eKASPER report.    0007  Access RN contacted back Dr. Moreno and he agreed to admit.    MEDICAL DECISION MAKING  Results were reviewed/discussed with the patient and they were also made aware of online access. Pt also made aware that some labs, such as cultures, will not be resulted during ER visit and follow up with PMD is necessary.     MDM  Number of Diagnoses or Management Options     Amount and/or Complexity of Data Reviewed  Clinical lab tests: reviewed (Unremarkable)  Discuss the patient with other providers: yes (David Alvarez RN)    Patient Progress  Patient progress: stable         DIAGNOSIS  Final diagnoses:   Suicidal ideation   Anxiety       DISPOSITION  ADMISSION    Discussed treatment plan and reason for admission with pt/family and admitting physician.  Pt/family voiced understanding of the plan for admission for further testing/treatment as needed.     Latest Documented Vital Signs:  As of 1:42 AM  BP- (!) 165/107 HR- 74 Temp- 97.7 °F (36.5 °C) (Tympanic) O2 sat- 98%    --  Documentation assistance provided by ana Savage for Blaine Johnson PA-C.  Information recorded by the ana was done at my direction and has been verified and validated by me.     Melonie Savage  03/10/19 0017       Blaine Johnson PA  03/10/19 0142

## 2019-03-10 NOTE — PLAN OF CARE
"Problem: Patient Care Overview  Goal: Plan of Care Review  Outcome: Ongoing (interventions implemented as appropriate)   03/10/19 1200 03/10/19 1745   OTHER   Outcome Summary --  Pt pleasant, cooperative w/ care and compliant w/ meds this shift. Voiced A/VH, anxiety 8/10 and depression 6/10. Pt stated \"I've been having crazy thoughts in my head\" when asked what brought him to the hospital. Denied SI/HI and pain at this time. Pt attended groups. No C/O or further issues reported at this time. Will continue to monitor and provide safe environment.    Plan of Care Review   Progress --  no change   Coping/Psychosocial   Plan of Care Reviewed With patient --    Coping/Psychosocial   Patient Agreement with Plan of Care agrees --        Problem: Overarching Goals (Adult)  Goal: Adheres to Safety Considerations for Self and Others  Outcome: Ongoing (interventions implemented as appropriate)   03/10/19 1745   Overarching Goals (Adult)   Adheres to Safety Considerations for Self and Others making progress toward outcome     Intervention: Develop and Maintain Individualized Safety Plan   03/10/19 1200 03/10/19 1600   C-SSRS (Recent)   Wish to be Dead no --    Suicidal Thoughts no --    Suicidal Thought with Method No Plan/Intent no --    Suicidal Intent (without Specific Plan) no --    Suicide Intent with Specific Plan no --    Describe Plan (Suicide Intent) no --    Suicide Behavior no --    Violence Risk   Feels Like Hurting Others no --    Previous Attempt to Harm Others no --    Develop and Maintain Individualized Safety Plan   Safety Measures --  safety rounds completed       Goal: Optimized Coping Skills in Response to Life Stressors  Outcome: Ongoing (interventions implemented as appropriate)   03/10/19 1745   Overarching Goals (Adult)   Optimized Coping Skills in Response to Life Stressors making progress toward outcome     Intervention: Promote Effective Coping Strategies   03/10/19 1200   Coping/Psychosocial " Interventions   Supportive Measures active listening utilized;verbalization of feelings encouraged;self-care encouraged;relaxation techniques promoted;goal setting facilitated       Goal: Develops/Participates in Therapeutic [place] to Support Successful Transition  Outcome: Ongoing (interventions implemented as appropriate)   03/10/19 1745   Overarching Goals (Adult)   Develops/Participates in Therapeutic [place] to Support Successful Transition making progress toward outcome     Intervention: Foster Therapeutic [place]   03/10/19 1200   Interventions   Trust Relationship/Rapport care explained;choices provided;thoughts/feelings acknowledged;reassurance provided;questions answered     Intervention: Mutually Develop Transition Plan   03/10/19 1200   Mutually Develop Transition Plan   Transition Support crisis management plan promoted         Problem: Suicidal Behavior (Adult)  Goal: Suicidal Behavior is Absent/Minimized/Managed  Outcome: Ongoing (interventions implemented as appropriate)   03/10/19 0024 03/10/19 1745   OTHER   Action Step/Short Term Goal (STG) Established 03/10/19 --    Suicidal Behavior is Absent/Minimized/Managed   Suicidal Behavior Managed/Minimized Time Frame for Action Step (STG) --  4 days   Suicidal Behavior Managed/Minimized Action Step (STG) Outcome --  making progress toward outcome

## 2019-03-10 NOTE — PLAN OF CARE
Problem: Suicidal Behavior (Adult)  Goal: Suicidal Behavior is Absent/Minimized/Managed  Outcome: Ongoing (interventions implemented as appropriate)   03/10/19 0024   OTHER   Action Step/Short Term Goal (STG) Established 03/10/19   Suicidal Behavior is Absent/Minimized/Managed   Suicidal Behavior Managed/Minimized Time Frame for Action Step (STG) 4 days   Suicidal Behavior Managed/Minimized Action Step (STG) Outcome making progress toward outcome

## 2019-03-11 LAB
ALBUMIN SERPL-MCNC: 4.7 G/DL (ref 3.5–5.2)
ALBUMIN/GLOB SERPL: 1.6 G/DL
ALP SERPL-CCNC: 78 U/L (ref 39–117)
ALT SERPL W P-5'-P-CCNC: 33 U/L (ref 1–41)
ANION GAP SERPL CALCULATED.3IONS-SCNC: 14.4 MMOL/L
AST SERPL-CCNC: 25 U/L (ref 1–40)
BASOPHILS # BLD AUTO: 0.05 10*3/MM3 (ref 0–0.2)
BASOPHILS NFR BLD AUTO: 0.5 % (ref 0–1.5)
BILIRUB SERPL-MCNC: 0.4 MG/DL (ref 0.1–1.2)
BUN BLD-MCNC: 14 MG/DL (ref 6–20)
BUN/CREAT SERPL: 19.4 (ref 7–25)
CALCIUM SPEC-SCNC: 10.2 MG/DL (ref 8.6–10.5)
CHLORIDE SERPL-SCNC: 100 MMOL/L (ref 98–107)
CHOLEST SERPL-MCNC: 139 MG/DL (ref 0–200)
CO2 SERPL-SCNC: 26.6 MMOL/L (ref 22–29)
CREAT BLD-MCNC: 0.72 MG/DL (ref 0.76–1.27)
DEPRECATED RDW RBC AUTO: 43 FL (ref 37–54)
EOSINOPHIL # BLD AUTO: 0.05 10*3/MM3 (ref 0–0.4)
EOSINOPHIL NFR BLD AUTO: 0.5 % (ref 0.3–6.2)
ERYTHROCYTE [DISTWIDTH] IN BLOOD BY AUTOMATED COUNT: 12.9 % (ref 12.3–15.4)
GFR SERPL CREATININE-BSD FRML MDRD: 118 ML/MIN/1.73
GLOBULIN UR ELPH-MCNC: 2.9 GM/DL
GLUCOSE BLD-MCNC: 122 MG/DL (ref 65–99)
HBA1C MFR BLD: 5.2 % (ref 4.8–5.6)
HCT VFR BLD AUTO: 43.6 % (ref 37.5–51)
HDLC SERPL-MCNC: 41 MG/DL (ref 40–60)
HGB BLD-MCNC: 14.2 G/DL (ref 13–17.7)
IMM GRANULOCYTES # BLD AUTO: 0.03 10*3/MM3 (ref 0–0.05)
IMM GRANULOCYTES NFR BLD AUTO: 0.3 % (ref 0–0.5)
LDLC SERPL CALC-MCNC: 79 MG/DL (ref 0–100)
LDLC/HDLC SERPL: 1.93 {RATIO}
LYMPHOCYTES # BLD AUTO: 2.13 10*3/MM3 (ref 0.7–3.1)
LYMPHOCYTES NFR BLD AUTO: 20.6 % (ref 19.6–45.3)
MCH RBC QN AUTO: 29.5 PG (ref 26.6–33)
MCHC RBC AUTO-ENTMCNC: 32.6 G/DL (ref 31.5–35.7)
MCV RBC AUTO: 90.6 FL (ref 79–97)
MONOCYTES # BLD AUTO: 0.78 10*3/MM3 (ref 0.1–0.9)
MONOCYTES NFR BLD AUTO: 7.6 % (ref 5–12)
NEUTROPHILS # BLD AUTO: 7.29 10*3/MM3 (ref 1.4–7)
NEUTROPHILS NFR BLD AUTO: 70.5 % (ref 42.7–76)
NRBC BLD AUTO-RTO: 0 /100 WBC (ref 0–0)
NT-PROBNP SERPL-MCNC: <5 PG/ML (ref 5–450)
PLATELET # BLD AUTO: 273 10*3/MM3 (ref 140–450)
PMV BLD AUTO: 9.5 FL (ref 6–12)
POTASSIUM BLD-SCNC: 4 MMOL/L (ref 3.5–5.2)
PROT SERPL-MCNC: 7.6 G/DL (ref 6–8.5)
RBC # BLD AUTO: 4.81 10*6/MM3 (ref 4.14–5.8)
SODIUM BLD-SCNC: 141 MMOL/L (ref 136–145)
TRIGL SERPL-MCNC: 95 MG/DL (ref 0–150)
TSH SERPL DL<=0.05 MIU/L-ACNC: 2.47 MIU/ML (ref 0.27–4.2)
VLDLC SERPL-MCNC: 19 MG/DL (ref 5–40)
WBC NRBC COR # BLD: 10.33 10*3/MM3 (ref 3.4–10.8)

## 2019-03-11 PROCEDURE — 84443 ASSAY THYROID STIM HORMONE: CPT | Performed by: HOSPITALIST

## 2019-03-11 PROCEDURE — 85025 COMPLETE CBC W/AUTO DIFF WBC: CPT | Performed by: HOSPITALIST

## 2019-03-11 PROCEDURE — 83880 ASSAY OF NATRIURETIC PEPTIDE: CPT | Performed by: HOSPITALIST

## 2019-03-11 PROCEDURE — 83036 HEMOGLOBIN GLYCOSYLATED A1C: CPT | Performed by: HOSPITALIST

## 2019-03-11 PROCEDURE — 80061 LIPID PANEL: CPT | Performed by: HOSPITALIST

## 2019-03-11 PROCEDURE — 80053 COMPREHEN METABOLIC PANEL: CPT | Performed by: HOSPITALIST

## 2019-03-11 RX ORDER — RISPERIDONE 1 MG/1
1 TABLET ORAL DAILY
Status: DISCONTINUED | OUTPATIENT
Start: 2019-03-11 | End: 2019-03-12

## 2019-03-11 RX ORDER — AMLODIPINE BESYLATE 10 MG/1
10 TABLET ORAL DAILY
Status: DISCONTINUED | OUTPATIENT
Start: 2019-03-12 | End: 2019-03-15 | Stop reason: HOSPADM

## 2019-03-11 RX ORDER — RISPERIDONE 1 MG/1
2 TABLET ORAL NIGHTLY
Status: DISCONTINUED | OUTPATIENT
Start: 2019-03-11 | End: 2019-03-12

## 2019-03-11 RX ADMIN — HYDROXYZINE PAMOATE 50 MG: 25 CAPSULE ORAL at 20:52

## 2019-03-11 RX ADMIN — LAMOTRIGINE 25 MG: 25 TABLET ORAL at 09:13

## 2019-03-11 RX ADMIN — NICOTINE POLACRILEX 4 MG: 4 GUM, CHEWING BUCCAL at 12:19

## 2019-03-11 RX ADMIN — Medication 100 MG: at 09:13

## 2019-03-11 RX ADMIN — RISPERIDONE 1 MG: 1 TABLET, FILM COATED ORAL at 12:16

## 2019-03-11 RX ADMIN — AMLODIPINE BESYLATE 5 MG: 5 TABLET ORAL at 09:13

## 2019-03-11 RX ADMIN — RISPERIDONE 2 MG: 1 TABLET, FILM COATED ORAL at 20:51

## 2019-03-11 RX ADMIN — BUPROPION HYDROCHLORIDE 150 MG: 150 TABLET, FILM COATED, EXTENDED RELEASE ORAL at 09:13

## 2019-03-11 NOTE — PLAN OF CARE
"Problem: Patient Care Overview  Goal: Plan of Care Review  Outcome: Ongoing (interventions implemented as appropriate)   03/10/19 1745 03/10/19 2141 03/11/19 0244   OTHER   Outcome Summary --  --  Pt has had increased anxiety this shift. Still endorcing auditory hallucinations. Able to contract for safety. Cursing, upset that he thought his medications were messed up and not what the doctor had ordered. Pt was educated about each medication, when he last took it, and when the next dose was due. Called to get pt PRN anxiety medication. Pt rated anxiety \"12\" At first pt cursed and refused to take it since it wasn't risperdal. Printed out medication information, pt later agreed to tale PRN Vistaril. Pt has exhibited bizarre behaviors all night. Flushing toilets, both in his room and in a vacant room at the end of the thapa. Pt wrote on windows with water \"SOS, help me, demon.\" Pt continues to get up and walk down the thapa to vacant room, trying doors, flushing toilets. Pt Has to be constantly reoriented. This nurse has had to sit outside pts door to monitor and redirect pt back to bed.   Plan of Care Review   Progress no change --  --    Coping/Psychosocial   Plan of Care Reviewed With --  patient --    Coping/Psychosocial   Patient Agreement with Plan of Care --  agrees --        Problem: Overarching Goals (Adult)  Goal: Adheres to Safety Considerations for Self and Others  Outcome: Ongoing (interventions implemented as appropriate)   03/11/19 0244   Overarching Goals (Adult)   Adheres to Safety Considerations for Self and Others making progress toward outcome     Goal: Optimized Coping Skills in Response to Life Stressors  Outcome: Ongoing (interventions implemented as appropriate)   03/11/19 0244   Overarching Goals (Adult)   Optimized Coping Skills in Response to Life Stressors making progress toward outcome     Goal: Develops/Participates in Therapeutic [place] to Support Successful Transition  Outcome: Ongoing " (interventions implemented as appropriate)   03/11/19 0244   Overarching Goals (Adult)   Develops/Participates in Therapeutic [place] to Support Successful Transition making progress toward outcome       Problem: Suicidal Behavior (Adult)  Goal: Suicidal Behavior is Absent/Minimized/Managed  Outcome: Ongoing (interventions implemented as appropriate)   03/11/19 0244   Suicidal Behavior is Absent/Minimized/Managed   Suicidal Behavior Managed/Minimized Action Step (STG) Outcome making progress toward outcome

## 2019-03-11 NOTE — PLAN OF CARE
Problem: Patient Care Overview  Goal: Discharge Needs Assessment  Outcome: Ongoing (interventions implemented as appropriate)   03/11/19 1431 03/11/19 1432   Discharge Needs Assessment   Readmission Within the Last 30 Days no previous admission in last 30 days --    Concerns to be Addressed decision making;suicidal;grief and loss;relationship;mental health;medication;financial/insurance;employment/school;coping/stress;compliance issue --    Patient/Family Anticipates Transition to home --    Patient/Family Anticipated Services at Transition mental health services --    Transportation Anticipated family or friend will provide --    Discharge Coordination/Progress --  Patient will return home with sister at discharge. Pt's therapy needs will be assessed on an ongoing basis. SW will explore discharge needs prior to discharge from [facility].

## 2019-03-11 NOTE — PROGRESS NOTES
The patient complains of severe anxiety when seen this morning though he seems calm.  He was offered as needed Vistaril last evening but refused this.  Earlier this morning he had been wandering into other patient's rooms.  He has known open precautions.  I will increase his Risperdal significantly to a dose of 1 mg every morning and 2 mg at bedtime.

## 2019-03-11 NOTE — NURSING NOTE
"Pt continues to not sleep, walk up and down the thapa. Offered the pt PRN Vistaril, initially pt said \"yes.\" Once medication was pulled and attempted to give, pt asked \"what is that?\" Explained to pt what it was and that he stated he would take it. Pt refused, stating \"I do not want that.\" Pt has refused all help given to him to help him relax and rest.   "

## 2019-03-11 NOTE — PLAN OF CARE
Problem: Patient Care Overview  Goal: Plan of Care Review  Outcome: Ongoing (interventions implemented as appropriate)   03/11/19 1642   OTHER   Outcome Summary Patient attended all Scheduled groups. Patient has been cooperative with staff and peers. Patient's behavior has been more appropriate this shift. No distress noted this shift. Patient denies SI or HI. Patient has been argumentative at times with staff but quickly redirects without problems.   Plan of Care Review   Progress improving   Coping/Psychosocial   Plan of Care Reviewed With patient   Coping/Psychosocial   Patient Agreement with Plan of Care agrees       Problem: Overarching Goals (Adult)  Goal: Optimized Coping Skills in Response to Life Stressors    Intervention: Promote Effective Coping Strategies   03/11/19 1642   Coping/Psychosocial Interventions   Supportive Measures active listening utilized;counseling provided;decision-making supported;self-responsibility promoted;problem solving facilitated;verbalization of feelings encouraged         Problem: Suicidal Behavior (Adult)  Intervention: Facilitate Resolution of Suicidal Intent   03/11/19 1642   Facilitate Resolution of Suicidal Intent   Mutually Determined Action Steps (Facilitate Resolution of Suicidal Intent) identifies protective factors     Intervention: Provide Immediate/Ongoing Protective Physical Environment   03/11/19 1642   Provide Immediate/Ongoing Protective Physical Environment   Mutually Determined Action Steps (Provide Immediate/Ongoing Protective Physical Environment) shares suicidal thoughts

## 2019-03-11 NOTE — PLAN OF CARE
Problem: Patient Care Overview  Goal: Individualization and Mutuality  Outcome: Ongoing (interventions implemented as appropriate)    Goal: Interprofessional Rounds/Family Conf  Outcome: Ongoing (interventions implemented as appropriate)   03/11/19 1433   Interdisciplinary Rounds/Family Conf   Participants ;art therapy;psychiatrist;nursing;pharmacy;social work   Interdisciplinary Rounds/Family Conf   Summary Treatment team met to create plan of care. Pt's therapy needs will be assessed. Pt will be encouraged to participate in therapeutic groups and classes. SW will explore outpt providers for mental health prior to D/C from [facility].     Patient/Guardian Signature: __________________________________            Psychiatrist Signature: ______________________________________             Therapist Signature: ________________________________________         Nurse Signature: ___________________________________________          Staff Signature: ____________________________________________            Staff Signature: ____________________________________________          Staff Signature: ____________________________________________          Staff Signature:                                                                                                      Problem: Suicidal Behavior (Adult)  Goal: Suicidal Behavior is Absent/Minimized/Managed  Outcome: Ongoing (interventions implemented as appropriate)

## 2019-03-11 NOTE — PROGRESS NOTES
Daily progress note    Chief complaint   Doing same   Still depressed  No other complaints    History of present illness  43-year-old white male with history of hypertension bipolar disorder alcohol abuse tobacco abuse admitted to inpatient psych unit with severe depression and suicidal ideation.  I am asked to follow the patient for medical problem and with a history and physical.  At the time of interview he still thinking about suicide but he has never committed suicide and he still depressed.  Patient denies any headache dizziness chest pain shortness of breath abdominal pain nausea vomiting diarrhea.  Patient also denies any fever chills night sweats weight loss or weight gain.     REVIEW OF SYSTEMS  Review of Systems   Constitutional: Negative for fever.   Respiratory: Negative for shortness of breath.    Cardiovascular: Negative for chest pain.   Genitourinary: Negative for dysuria.   Neurological: Negative for light-headedness.   Psychiatric/Behavioral: Positive for sleep disturbance (insomnia) and suicidal ideas. The patient is nervous/anxious.         + depressed mood       PHYSICAL EXAM  Blood pressure 135/82, pulse 97, temperature 97.6 °F (36.4 °C), temperature source Oral, resp. rate 20, SpO2 98 %.    Constitutional: No distress.   Head: Normocephalic and atraumatic.   Eyes: EOM are normal.   Neck: Normal range of motion.   Pulmonary/Chest: No respiratory distress.   Abdominal: There is no tenderness.   Musculoskeletal: He exhibits no edema.   Neurological: He is alert.   Skin: Skin is warm and dry.   Psychiatric: He has a flat affect.     LAB RESULTS  Lab Results (last 24 hours)     Procedure Component Value Units Date/Time    BNP [[id number]]  (Abnormal) Collected:  03/11/19 0809    Specimen:  Blood Updated:  03/11/19 0944     proBNP <5.0 pg/mL     Narrative:       Among patients with dyspnea, NT-proBNP is highly sensitive for the detection of acute congestive heart failure. In addition NT-proBNP of  <300 pg/ml effectively rules out acute congestive heart failure with 99% negative predictive value.    TSH [[id number]]  (Normal) Collected:  03/11/19 0809    Specimen:  Blood Updated:  03/11/19 0919     TSH 2.470 mIU/mL     Comprehensive Metabolic Panel [[id number]]  (Abnormal) Collected:  03/11/19 0809    Specimen:  Blood Updated:  03/11/19 0918     Glucose 122 mg/dL      BUN 14 mg/dL      Creatinine 0.72 mg/dL      Sodium 141 mmol/L      Potassium 4.0 mmol/L      Chloride 100 mmol/L      CO2 26.6 mmol/L      Calcium 10.2 mg/dL      Total Protein 7.6 g/dL      Albumin 4.70 g/dL      ALT (SGPT) 33 U/L      AST (SGOT) 25 U/L      Alkaline Phosphatase 78 U/L      Total Bilirubin 0.4 mg/dL      eGFR Non African Amer 118 mL/min/1.73      Globulin 2.9 gm/dL      A/G Ratio 1.6 g/dL      BUN/Creatinine Ratio 19.4     Anion Gap 14.4 mmol/L     Narrative:       GFR Normal >60  Chronic Kidney Disease <60  Kidney Failure <15    Lipid Panel [[id number]] Collected:  03/11/19 0809    Specimen:  Blood Updated:  03/11/19 0912     Total Cholesterol 139 mg/dL      Triglycerides 95 mg/dL      HDL Cholesterol 41 mg/dL      LDL Cholesterol  79 mg/dL      VLDL Cholesterol 19 mg/dL      LDL/HDL Ratio 1.93    Narrative:       Cholesterol Reference Ranges  (U.S. Department of Health and Human Services ATP III Classifications)    Desirable          <200 mg/dL  Borderline High    200-239 mg/dL  High Risk          >240 mg/dL      Triglyceride Reference Ranges  (U.S. Department of Health and Human Services ATP III Classifications)    Normal           <150 mg/dL  Borderline High  150-199 mg/dL  High             200-499 mg/dL  Very High        >500 mg/dL    HDL Reference Ranges  (U.S. Department of Health and Human Services ATP III Classifcations)    Low     <40 mg/dl (major risk factor for CHD)  High    >60 mg/dl ('negative' risk factor for CHD)        LDL Reference Ranges  (U.S. Department of Health and Human Services ATP III  Classifcations)    Optimal          <100 mg/dL  Near Optimal     100-129 mg/dL  Borderline High  130-159 mg/dL  High             160-189 mg/dL  Very High        >189 mg/dL    Hemoglobin A1c [[id number]]  (Normal) Collected:  03/11/19 0809    Specimen:  Blood Updated:  03/11/19 0843     Hemoglobin A1C 5.20 %     Narrative:       Hemoglobin A1C Ranges:    Increased Risk for Diabetes  5.7% to 6.4%  Diabetes                     >= 6.5%  Diabetic Goal                < 7.0%    CBC & Differential [[id number]] Collected:  03/11/19 0809    Specimen:  Blood Updated:  03/11/19 0833    Narrative:       The following orders were created for panel order CBC & Differential.  Procedure                               Abnormality         Status                     ---------                               -----------         ------                     CBC Auto Differential[[id number]]        Abnormal            Final result                 Please view results for these tests on the individual orders.    CBC Auto Differential [[id number]]  (Abnormal) Collected:  03/11/19 0809    Specimen:  Blood Updated:  03/11/19 0833     WBC 10.33 10*3/mm3      RBC 4.81 10*6/mm3      Hemoglobin 14.2 g/dL      Hematocrit 43.6 %      MCV 90.6 fL      MCH 29.5 pg      MCHC 32.6 g/dL      RDW 12.9 %      RDW-SD 43.0 fl      MPV 9.5 fL      Platelets 273 10*3/mm3      Neutrophil % 70.5 %      Lymphocyte % 20.6 %      Monocyte % 7.6 %      Eosinophil % 0.5 %      Basophil % 0.5 %      Immature Grans % 0.3 %      Neutrophils, Absolute 7.29 10*3/mm3      Lymphocytes, Absolute 2.13 10*3/mm3      Monocytes, Absolute 0.78 10*3/mm3      Eosinophils, Absolute 0.05 10*3/mm3      Basophils, Absolute 0.05 10*3/mm3      Immature Grans, Absolute 0.03 10*3/mm3      nRBC 0.0 /100 WBC         Imaging Results (last 24 hours)     ** No results found for the last 24 hours. **          Current Facility-Administered Medications:   •  acetaminophen (TYLENOL) tablet 650 mg, 650 mg,  Oral, Q4H PRN, Arley Moreno III, MD  •  aluminum-magnesium hydroxide-simethicone (MAALOX MAX) 400-400-40 MG/5ML suspension 15 mL, 15 mL, Oral, Q6H PRN, Arley Moreno III, MD  •  amLODIPine (NORVASC) tablet 5 mg, 5 mg, Oral, Daily, Velasquez Dumont MD, 5 mg at 03/11/19 0913  •  buPROPion XL (WELLBUTRIN XL) 24 hr tablet 150 mg, 150 mg, Oral, Daily, Velasquez Dumont MD, 150 mg at 03/11/19 0913  •  hydrOXYzine pamoate (VISTARIL) capsule 50 mg, 50 mg, Oral, Q6H PRN, Arley Moreno III, MD, 50 mg at 03/10/19 2238  •  lamoTRIgine (LaMICtal) tablet 25 mg, 25 mg, Oral, Daily, Velasquez Dumont MD, 25 mg at 03/11/19 0913  •  magnesium hydroxide (MILK OF MAGNESIA) suspension 2400 mg/10mL 10 mL, 10 mL, Oral, Daily PRN, Arley Moreno III, MD  •  nicotine polacrilex (NICORETTE) gum 4 mg, 4 mg, Mouth/Throat, Q2H PRN, Blaine Miranda MD, 4 mg at 03/11/19 1219  •  pantoprazole (PROTONIX) EC tablet 40 mg, 40 mg, Oral, Q AM, Velasquez Dumont MD  •  risperiDONE (risperDAL) tablet 1 mg, 1 mg, Oral, Daily, Arley Moreno III, MD, 1 mg at 03/11/19 1216  •  risperiDONE (risperDAL) tablet 2 mg, 2 mg, Oral, Nightly, Arley Moreno III, MD  •  thiamine (VITAMIN B-1) tablet 100 mg, 100 mg, Oral, Daily, Velasquez Dumont MD, 100 mg at 03/11/19 0913     ASSESSMENT  Severe depression with suicidal ideation per psychiatry  Bipolar disorder  Anxiety disorder  Depression  Hypertension  Tobacco abuse  Alcohol abuse  Gastroesophageal reflux disease    PLAN  CPM  Home medications   Stress ulcer prophylaxis  No need for DVT prophylaxis  Supportive care  Patient is full code  We will follow with Dr. NEIL DUMONT MD

## 2019-03-12 LAB
CHOLEST SERPL-MCNC: 140 MG/DL (ref 0–200)
HDLC SERPL-MCNC: 38 MG/DL (ref 40–60)
LDLC SERPL CALC-MCNC: 83 MG/DL (ref 0–100)
LDLC/HDLC SERPL: 2.18 {RATIO}
TRIGL SERPL-MCNC: 95 MG/DL (ref 0–150)
VLDLC SERPL-MCNC: 19 MG/DL (ref 5–40)

## 2019-03-12 PROCEDURE — 80061 LIPID PANEL: CPT | Performed by: SPECIALIST

## 2019-03-12 RX ORDER — NYSTATIN 100000 U/G
CREAM TOPICAL EVERY 12 HOURS SCHEDULED
Status: DISCONTINUED | OUTPATIENT
Start: 2019-03-12 | End: 2019-03-12

## 2019-03-12 RX ORDER — RISPERIDONE 1 MG/1
2 TABLET ORAL 2 TIMES DAILY
Status: DISCONTINUED | OUTPATIENT
Start: 2019-03-12 | End: 2019-03-15 | Stop reason: HOSPADM

## 2019-03-12 RX ORDER — CLOTRIMAZOLE AND BETAMETHASONE DIPROPIONATE 10; .64 MG/G; MG/G
CREAM TOPICAL EVERY 12 HOURS SCHEDULED
Status: DISCONTINUED | OUTPATIENT
Start: 2019-03-12 | End: 2019-03-15 | Stop reason: HOSPADM

## 2019-03-12 RX ADMIN — NICOTINE POLACRILEX 4 MG: 4 GUM, CHEWING BUCCAL at 13:08

## 2019-03-12 RX ADMIN — ACETAMINOPHEN 650 MG: 325 TABLET, FILM COATED ORAL at 09:03

## 2019-03-12 RX ADMIN — NYSTATIN: 100000 CREAM TOPICAL at 13:14

## 2019-03-12 RX ADMIN — Medication 100 MG: at 08:57

## 2019-03-12 RX ADMIN — RISPERIDONE 1 MG: 1 TABLET, FILM COATED ORAL at 08:57

## 2019-03-12 RX ADMIN — AMLODIPINE BESYLATE 10 MG: 10 TABLET ORAL at 08:57

## 2019-03-12 RX ADMIN — NICOTINE POLACRILEX 4 MG: 4 GUM, CHEWING BUCCAL at 18:48

## 2019-03-12 RX ADMIN — NICOTINE POLACRILEX 4 MG: 4 GUM, CHEWING BUCCAL at 11:05

## 2019-03-12 RX ADMIN — HYDROXYZINE PAMOATE 50 MG: 25 CAPSULE ORAL at 21:44

## 2019-03-12 RX ADMIN — RISPERIDONE 2 MG: 1 TABLET, FILM COATED ORAL at 21:43

## 2019-03-12 RX ADMIN — LAMOTRIGINE 25 MG: 25 TABLET ORAL at 08:57

## 2019-03-12 RX ADMIN — PANTOPRAZOLE SODIUM 40 MG: 40 TABLET, DELAYED RELEASE ORAL at 09:03

## 2019-03-12 RX ADMIN — BUPROPION HYDROCHLORIDE 150 MG: 150 TABLET, FILM COATED, EXTENDED RELEASE ORAL at 08:57

## 2019-03-12 NOTE — PROGRESS NOTES
"The patient continues to exhibit bizarre behavior.  He has been inappropriate in group settings and has written on the wall of his room \"casting out demons\" in crayon.  I am continue upper titration of Risperdal and may consider addition of a mood stabilizer medication such as Trileptal.  "

## 2019-03-12 NOTE — PLAN OF CARE
Problem: Patient Care Overview  Goal: Plan of Care Review  Outcome: Ongoing (interventions implemented as appropriate)   03/12/19 1030 03/12/19 1641   OTHER   Outcome Summary --  Pt cooperative w/ care and compliant w/ meds this shift. Pt voiced anxiety 5/10 and depression 2/10. Denied SI/HI and A/VH. C/O lower back pain, declined pain relief. Pt presenting w/ rash on upper thigh, given orders. Pt attending all groups and has shown no further inappropriate behavior at this time. Will continue to monitor and provide safe environment.    Coping/Psychosocial   Plan of Care Reviewed With patient --    Coping/Psychosocial   Patient Agreement with Plan of Care agrees --        Problem: Overarching Goals (Adult)  Goal: Adheres to Safety Considerations for Self and Others  Outcome: Ongoing (interventions implemented as appropriate)   03/12/19 1641   Overarching Goals (Adult)   Adheres to Safety Considerations for Self and Others making progress toward outcome     Intervention: Develop and Maintain Individualized Safety Plan   03/12/19 1030 03/12/19 1600   C-SSRS (Recent)   Wish to be Dead no --    Suicidal Thoughts no --    Suicidal Thought with Method No Plan/Intent no --    Suicidal Intent (without Specific Plan) no --    Suicide Intent with Specific Plan no --    Describe Plan (Suicide Intent) no --    Suicide Behavior no --    Violence Risk   Feels Like Hurting Others no --    Previous Attempt to Harm Others no --    Develop and Maintain Individualized Safety Plan   Safety Measures --  safety rounds completed       Goal: Optimized Coping Skills in Response to Life Stressors  Outcome: Ongoing (interventions implemented as appropriate)   03/12/19 1641   Overarching Goals (Adult)   Optimized Coping Skills in Response to Life Stressors making progress toward outcome     Intervention: Promote Effective Coping Strategies   03/12/19 1030   Coping/Psychosocial Interventions   Supportive Measures active listening  utilized;verbalization of feelings encouraged;self-care encouraged;relaxation techniques promoted;goal setting facilitated       Goal: Develops/Participates in Therapeutic [facility] to Support Successful Transition  Outcome: Ongoing (interventions implemented as appropriate)   03/12/19 1641   Overarching Goals (Adult)   Develops/Participates in Therapeutic [facility] to Support Successful Transition making progress toward outcome     Intervention: Foster Therapeutic [facility]   03/12/19 1030   Interventions   Trust Relationship/Rapport care explained;choices provided;thoughts/feelings acknowledged;reassurance provided;questions answered     Intervention: Mutually Develop Transition Plan   03/12/19 1030   Mutually Develop Transition Plan   Transition Support crisis management plan promoted         Problem: Suicidal Behavior (Adult)  Goal: Suicidal Behavior is Absent/Minimized/Managed  Outcome: Ongoing (interventions implemented as appropriate)   03/10/19 0024 03/12/19 1641   OTHER   Action Step/Short Term Goal (STG) Established 03/10/19 --    Suicidal Behavior is Absent/Minimized/Managed   Suicidal Behavior Managed/Minimized Time Frame for Action Step (STG) --  2 days   Suicidal Behavior Managed/Minimized Action Step (STG) Outcome --  making progress toward outcome

## 2019-03-12 NOTE — PROGRESS NOTES
Daily progress note    Chief complaint   Complaint of rash in the inner thighs    History of present illness  43-year-old white male with history of hypertension bipolar disorder alcohol abuse tobacco abuse admitted to inpatient psych unit with severe depression and suicidal ideation.  I am asked to follow the patient for medical problem and with a history and physical.  At the time of interview he still thinking about suicide but he has never committed suicide and he still depressed.  Patient denies any headache dizziness chest pain shortness of breath abdominal pain nausea vomiting diarrhea.  Patient also denies any fever chills night sweats weight loss or weight gain.     REVIEW OF SYSTEMS  Review of Systems   Constitutional: Negative for fever.   Respiratory: Negative for shortness of breath.    Cardiovascular: Negative for chest pain.   Genitourinary: Negative for dysuria.   Neurological: Negative for light-headedness.   Psychiatric/Behavioral: Positive for sleep disturbance (insomnia) and suicidal ideas. The patient is nervous/anxious.         + depressed mood       PHYSICAL EXAM  Blood pressure 139/83, pulse 96, temperature 97.4 °F (36.3 °C), temperature source Oral, resp. rate 20, SpO2 97 %.    Constitutional: No distress.   Head: Normocephalic and atraumatic.   Eyes: EOM are normal.   Neck: Normal range of motion.   Pulmonary/Chest: No respiratory distress.   Abdominal: There is no tenderness.   Musculoskeletal: He exhibits no edema.   Neurological: He is alert.   Skin: Skin is warm and dry.   Psychiatric: He has a flat affect.     LAB RESULTS  Lab Results (last 24 hours)     Procedure Component Value Units Date/Time    Lipid Panel [[id number]]  (Abnormal) Collected:  03/12/19 0802    Specimen:  Blood Updated:  03/12/19 0844     Total Cholesterol 140 mg/dL      Triglycerides 95 mg/dL      HDL Cholesterol 38 mg/dL      LDL Cholesterol  83 mg/dL      VLDL Cholesterol 19 mg/dL      LDL/HDL Ratio 2.18     Narrative:       Cholesterol Reference Ranges  (U.S. Department of Health and Human Services ATP III Classifications)    Desirable          <200 mg/dL  Borderline High    200-239 mg/dL  High Risk          >240 mg/dL      Triglyceride Reference Ranges  (U.S. Department of Health and Human Services ATP III Classifications)    Normal           <150 mg/dL  Borderline High  150-199 mg/dL  High             200-499 mg/dL  Very High        >500 mg/dL    HDL Reference Ranges  (U.S. Department of Health and Human Services ATP III Classifcations)    Low     <40 mg/dl (major risk factor for CHD)  High    >60 mg/dl ('negative' risk factor for CHD)        LDL Reference Ranges  (U.S. Department of Health and Human Services ATP III Classifcations)    Optimal          <100 mg/dL  Near Optimal     100-129 mg/dL  Borderline High  130-159 mg/dL  High             160-189 mg/dL  Very High        >189 mg/dL        Imaging Results (last 24 hours)     ** No results found for the last 24 hours. **          Current Facility-Administered Medications:   •  acetaminophen (TYLENOL) tablet 650 mg, 650 mg, Oral, Q4H PRN, Arley Moreno III, MD, 650 mg at 03/12/19 0903  •  aluminum-magnesium hydroxide-simethicone (MAALOX MAX) 400-400-40 MG/5ML suspension 15 mL, 15 mL, Oral, Q6H PRN, Arley Moreno III, MD  •  amLODIPine (NORVASC) tablet 10 mg, 10 mg, Oral, Daily, Velasquez Dumont MD, 10 mg at 03/12/19 0857  •  buPROPion XL (WELLBUTRIN XL) 24 hr tablet 150 mg, 150 mg, Oral, Daily, Velasquez Dumont MD, 150 mg at 03/12/19 0857  •  hydrOXYzine pamoate (VISTARIL) capsule 50 mg, 50 mg, Oral, Q6H PRN, Arley Moreno III, MD, 50 mg at 03/11/19 2052  •  lamoTRIgine (LaMICtal) tablet 25 mg, 25 mg, Oral, Daily, Velasquez Dumont MD, 25 mg at 03/12/19 0857  •  magnesium hydroxide (MILK OF MAGNESIA) suspension 2400 mg/10mL 10 mL, 10 mL, Oral, Daily PRN, Arley Moreno III, MD  •  nicotine polacrilex (NICORETTE) gum 4 mg, 4  mg, Mouth/Throat, Q2H PRN, Blaine Miranda MD, 4 mg at 03/12/19 1308  •  nystatin (MYCOSTATIN) 758435 UNIT/GM cream, , Topical, Q12H, Velasquez Dumont MD  •  pantoprazole (PROTONIX) EC tablet 40 mg, 40 mg, Oral, Q AM, Velasquez Dumont MD, 40 mg at 03/12/19 0903  •  risperiDONE (risperDAL) tablet 2 mg, 2 mg, Oral, BID, Arley Moreno III, MD  •  thiamine (VITAMIN B-1) tablet 100 mg, 100 mg, Oral, Daily, Velasquez Dumont MD, 100 mg at 03/12/19 0857     ASSESSMENT  Severe depression with suicidal ideation  RASH seems like fungal  Bipolar disorder  Anxiety disorder  Depression  Hypertension  Tobacco abuse  Alcohol abuse  Gastroesophageal reflux disease    PLAN  CPM  Home medications   Lotrisone cream twice daily  Stress ulcer prophylaxis  No need for DVT prophylaxis  Supportive care  Patient is full code  We will follow with Dr. NEIL DUMONT MD

## 2019-03-12 NOTE — PLAN OF CARE
"Problem: Patient Care Overview  Goal: Plan of Care Review  Outcome: Ongoing (interventions implemented as appropriate)   03/11/19 2306 03/12/19 0330   OTHER   Outcome Summary --  Pt compliant with medication. Pt rates anxiety and depression 6/10,denies SI,HI, and pain. Staff went into patient's room at 2300 and noticed patient wrote mutiple times all over wall in crayon \"casting out demons\". Pt denied having any writing utensils and then nurse found them hiding under his clothes. Will continue to monitor patient's mood and behavior and provide a safe environment.    Plan of Care Review   Progress --  no change   Coping/Psychosocial   Plan of Care Reviewed With patient --    Coping/Psychosocial   Patient Agreement with Plan of Care agrees --        Problem: Overarching Goals (Adult)  Goal: Adheres to Safety Considerations for Self and Others  Outcome: Ongoing (interventions implemented as appropriate)    Goal: Optimized Coping Skills in Response to Life Stressors  Outcome: Ongoing (interventions implemented as appropriate)    Goal: Develops/Participates in Therapeutic [place] to Support Successful Transition  Outcome: Ongoing (interventions implemented as appropriate)      Problem: Suicidal Behavior (Adult)  Goal: Suicidal Behavior is Absent/Minimized/Managed  Outcome: Ongoing (interventions implemented as appropriate)        "

## 2019-03-13 RX ORDER — OXCARBAZEPINE 300 MG/1
300 TABLET, FILM COATED ORAL 2 TIMES DAILY
Status: DISCONTINUED | OUTPATIENT
Start: 2019-03-13 | End: 2019-03-15 | Stop reason: HOSPADM

## 2019-03-13 RX ADMIN — NICOTINE POLACRILEX 4 MG: 4 GUM, CHEWING BUCCAL at 08:34

## 2019-03-13 RX ADMIN — OXCARBAZEPINE 300 MG: 300 TABLET, FILM COATED ORAL at 12:58

## 2019-03-13 RX ADMIN — RISPERIDONE 2 MG: 1 TABLET, FILM COATED ORAL at 12:58

## 2019-03-13 RX ADMIN — HYDROXYZINE PAMOATE 50 MG: 25 CAPSULE ORAL at 22:26

## 2019-03-13 RX ADMIN — NICOTINE POLACRILEX 4 MG: 4 GUM, CHEWING BUCCAL at 13:00

## 2019-03-13 RX ADMIN — BUPROPION HYDROCHLORIDE 150 MG: 150 TABLET, FILM COATED, EXTENDED RELEASE ORAL at 08:32

## 2019-03-13 RX ADMIN — LAMOTRIGINE 25 MG: 25 TABLET ORAL at 08:32

## 2019-03-13 RX ADMIN — RISPERIDONE 2 MG: 1 TABLET, FILM COATED ORAL at 22:25

## 2019-03-13 RX ADMIN — PANTOPRAZOLE SODIUM 40 MG: 40 TABLET, DELAYED RELEASE ORAL at 08:33

## 2019-03-13 RX ADMIN — Medication 100 MG: at 08:33

## 2019-03-13 RX ADMIN — AMLODIPINE BESYLATE 10 MG: 10 TABLET ORAL at 08:33

## 2019-03-13 RX ADMIN — OXCARBAZEPINE 300 MG: 300 TABLET, FILM COATED ORAL at 22:25

## 2019-03-13 RX ADMIN — CLOTRIMAZOLE AND BETAMETHASONE DIPROPIONATE: 10; .5 CREAM TOPICAL at 08:34

## 2019-03-13 NOTE — PROGRESS NOTES
The patient seems to be slowly improving but does admit to some ongoing psychotic thinking.  His behavior has been much more appropriate within the therapeutic milieu however.  Given the patient's history of poor compliance, it is my feeling that Lamictal is probably not an ideal choice for him.  I have spoken with the patient today regarding initiation of Trileptal which will be added to his current medication regimen.  The patient is agreeable.

## 2019-03-13 NOTE — PLAN OF CARE
Problem: Patient Care Overview  Goal: Plan of Care Review  Outcome: Ongoing (interventions implemented as appropriate)   03/13/19 0845 03/13/19 1619   OTHER   Outcome Summary --  Pt cooperative w/ care and compliant w/ meds this shift. Voiced anxiety 7/10 and depression 5/10. Denied SI/HI, A/VH, and pain. Pt has attended group and has had no further issues reported at this time. WIll continue to monitor and provide safe environment.   Plan of Care Review   Progress --  no change   Coping/Psychosocial   Plan of Care Reviewed With patient --    Coping/Psychosocial   Patient Agreement with Plan of Care agrees --        Problem: Overarching Goals (Adult)  Goal: Adheres to Safety Considerations for Self and Others  Outcome: Ongoing (interventions implemented as appropriate)   03/13/19 1619   Overarching Goals (Adult)   Adheres to Safety Considerations for Self and Others making progress toward outcome     Intervention: Develop and Maintain Individualized Safety Plan   03/13/19 0845 03/13/19 1600   C-SSRS (Recent)   Wish to be Dead no --    Suicidal Thoughts no --    Suicidal Thought with Method No Plan/Intent no --    Suicidal Intent (without Specific Plan) no --    Suicide Intent with Specific Plan no --    Describe Plan (Suicide Intent) no --    Suicide Behavior no --    Violence Risk   Feels Like Hurting Others no --    Previous Attempt to Harm Others no --    Develop and Maintain Individualized Safety Plan   Safety Measures --  safety rounds completed       Goal: Optimized Coping Skills in Response to Life Stressors  Outcome: Ongoing (interventions implemented as appropriate)   03/13/19 1619   Overarching Goals (Adult)   Optimized Coping Skills in Response to Life Stressors making progress toward outcome     Intervention: Promote Effective Coping Strategies   03/13/19 0845   Coping/Psychosocial Interventions   Supportive Measures active listening utilized;verbalization of feelings encouraged;self-care encouraged;goal  setting facilitated;positive reinforcement provided       Goal: Develops/Participates in Therapeutic [place] to Support Successful Transition  Outcome: Ongoing (interventions implemented as appropriate)   03/13/19 1619   Overarching Goals (Adult)   Develops/Participates in Therapeutic [place] to Support Successful Transition making progress toward outcome     Intervention: Foster Therapeutic [place]   03/13/19 0845   Interventions   Trust Relationship/Rapport care explained;choices provided;thoughts/feelings acknowledged;reassurance provided;questions answered     Intervention: Mutually Develop Transition Plan   03/13/19 0845   Mutually Develop Transition Plan   Transition Support crisis management plan promoted         Problem: Suicidal Behavior (Adult)  Goal: Suicidal Behavior is Absent/Minimized/Managed  Outcome: Ongoing (interventions implemented as appropriate)   03/10/19 0024 03/13/19 1619   OTHER   Action Step/Short Term Goal (STG) Established 03/10/19 --    Suicidal Behavior is Absent/Minimized/Managed   Suicidal Behavior Managed/Minimized Time Frame for Action Step (STG) --  1 day   Suicidal Behavior Managed/Minimized Action Step (STG) Outcome --  making progress toward outcome

## 2019-03-13 NOTE — PLAN OF CARE
Problem: Patient Care Overview  Goal: Plan of Care Review  Outcome: Ongoing (interventions implemented as appropriate)   03/12/19 2230 03/13/19 0307   OTHER   Outcome Summary --  Pt has been cooperative with care and medications. No behavioral issues as of yet. Pt appears to be resting well. Denies SI, HI. Social in the lounge with peers. Will continue to monitor.    Plan of Care Review   Progress --  no change   Coping/Psychosocial   Plan of Care Reviewed With patient --    Coping/Psychosocial   Patient Agreement with Plan of Care agrees --        Problem: Overarching Goals (Adult)  Goal: Adheres to Safety Considerations for Self and Others  Outcome: Ongoing (interventions implemented as appropriate)   03/13/19 0307   Overarching Goals (Adult)   Adheres to Safety Considerations for Self and Others making progress toward outcome     Intervention: Develop and Maintain Individualized Safety Plan   03/12/19 2230 03/13/19 0200   C-SSRS (Recent)   Wish to be Dead no --    Suicidal Thoughts no --    Suicidal Thought with Method No Plan/Intent no --    Suicidal Intent (without Specific Plan) no --    Suicide Intent with Specific Plan no --    Describe Plan (Suicide Intent) no --    Suicide Behavior no --    Violence Risk   Feels Like Hurting Others no --    Previous Attempt to Harm Others no --    Develop and Maintain Individualized Safety Plan   Safety Measures --  safety rounds completed       Goal: Optimized Coping Skills in Response to Life Stressors  Outcome: Ongoing (interventions implemented as appropriate)   03/13/19 0307   Overarching Goals (Adult)   Optimized Coping Skills in Response to Life Stressors making progress toward outcome     Intervention: Promote Effective Coping Strategies   03/12/19 2230   Coping/Psychosocial Interventions   Supportive Measures active listening utilized;verbalization of feelings encouraged       Goal: Develops/Participates in Therapeutic [place] to Support Successful  Transition  Outcome: Ongoing (interventions implemented as appropriate)   03/13/19 0307   Overarching Goals (Adult)   Develops/Participates in Therapeutic [place] to Support Successful Transition making progress toward outcome     Intervention: Foster Therapeutic [place]   03/12/19 2230   Interventions   Trust Relationship/Rapport care explained;thoughts/feelings acknowledged     Intervention: Mutually Develop Transition Plan   03/12/19 2230   Mutually Develop Transition Plan   Transition Support crisis management plan promoted         Problem: Suicidal Behavior (Adult)  Goal: Suicidal Behavior is Absent/Minimized/Managed  Outcome: Ongoing (interventions implemented as appropriate)   03/13/19 0307   Suicidal Behavior is Absent/Minimized/Managed   Suicidal Behavior Managed/Minimized Time Frame for Action Step (STG) 1 day   Suicidal Behavior Managed/Minimized Action Step (STG) Outcome making progress toward outcome

## 2019-03-13 NOTE — PROGRESS NOTES
Daily progress note    Chief complaint   Doing better  No new complaints    History of present illness  43-year-old white male with history of hypertension bipolar disorder alcohol abuse tobacco abuse admitted to inpatient psych unit with severe depression and suicidal ideation.  I am asked to follow the patient for medical problem and with a history and physical.  At the time of interview he still thinking about suicide but he has never committed suicide and he still depressed.  Patient denies any headache dizziness chest pain shortness of breath abdominal pain nausea vomiting diarrhea.  Patient also denies any fever chills night sweats weight loss or weight gain.     REVIEW OF SYSTEMS  Review of Systems   Constitutional: Negative for fever.   Respiratory: Negative for shortness of breath.    Cardiovascular: Negative for chest pain.   Genitourinary: Negative for dysuria.   Neurological: Negative for light-headedness.   Psychiatric/Behavioral: Positive for sleep disturbance (insomnia) and suicidal ideas. The patient is nervous/anxious.         + depressed mood       PHYSICAL EXAM  Blood pressure 131/89, pulse 100, temperature 96.9 °F (36.1 °C), temperature source Oral, resp. rate 18, SpO2 98 %.    Constitutional: No distress.   Head: Normocephalic and atraumatic.   Eyes: EOM are normal.   Neck: Normal range of motion.   Pulmonary/Chest: No respiratory distress.   Abdominal: There is no tenderness.   Musculoskeletal: He exhibits no edema.   Neurological: He is alert.   Skin: Skin is warm and dry.   Psychiatric: He has a flat affect.     LAB RESULTS  Lab Results (last 24 hours)     ** No results found for the last 24 hours. **        Imaging Results (last 24 hours)     ** No results found for the last 24 hours. **          Current Facility-Administered Medications:   •  acetaminophen (TYLENOL) tablet 650 mg, 650 mg, Oral, Q4H PRN, Arley Moreno III, MD, 650 mg at 03/12/19 0903  •  aluminum-magnesium  hydroxide-simethicone (MAALOX MAX) 400-400-40 MG/5ML suspension 15 mL, 15 mL, Oral, Q6H PRN, Arley Moreno III, MD  •  amLODIPine (NORVASC) tablet 10 mg, 10 mg, Oral, Daily, Velasquez Dumont MD, 10 mg at 03/13/19 0833  •  buPROPion XL (WELLBUTRIN XL) 24 hr tablet 150 mg, 150 mg, Oral, Daily, Velasquez Dumont MD, 150 mg at 03/13/19 0832  •  clotrimazole-betamethasone (LOTRISONE) 1-0.05 % cream, , Topical, Q12H, Velasquez Dumont MD  •  hydrOXYzine pamoate (VISTARIL) capsule 50 mg, 50 mg, Oral, Q6H PRN, Arley Moreno III, MD, 50 mg at 03/12/19 2144  •  lamoTRIgine (LaMICtal) tablet 25 mg, 25 mg, Oral, Daily, Velasquez Dumont MD, 25 mg at 03/13/19 0832  •  magnesium hydroxide (MILK OF MAGNESIA) suspension 2400 mg/10mL 10 mL, 10 mL, Oral, Daily PRN, Arley Moreno III, MD  •  nicotine polacrilex (NICORETTE) gum 4 mg, 4 mg, Mouth/Throat, Q2H PRN, Blaine Miranda MD, 4 mg at 03/13/19 1300  •  OXcarbazepine (TRILEPTAL) tablet 300 mg, 300 mg, Oral, BID, Arley Moreno III, MD, 300 mg at 03/13/19 1258  •  pantoprazole (PROTONIX) EC tablet 40 mg, 40 mg, Oral, Q AM, Velasquez Dumont MD, 40 mg at 03/13/19 0833  •  risperiDONE (risperDAL) tablet 2 mg, 2 mg, Oral, BID, Arley Moreno III, MD, 2 mg at 03/13/19 1258  •  thiamine (VITAMIN B-1) tablet 100 mg, 100 mg, Oral, Daily, Velasquez Dumont MD, 100 mg at 03/13/19 0833     ASSESSMENT  Severe depression with suicidal ideation  RASH bilateral inner thigh improving  Bipolar disorder  Anxiety disorder  Depression  Hypertension  Tobacco abuse  Alcohol abuse  Gastroesophageal reflux disease    PLAN  CPM  Home medications   Lotrisone cream twice daily  Stress ulcer prophylaxis  Supportive care  We will follow with Dr. NEIL DUMONT MD

## 2019-03-14 RX ORDER — SIMETHICONE 80 MG
80 TABLET,CHEWABLE ORAL 4 TIMES DAILY
Status: DISCONTINUED | OUTPATIENT
Start: 2019-03-14 | End: 2019-03-15 | Stop reason: HOSPADM

## 2019-03-14 RX ADMIN — CLOTRIMAZOLE AND BETAMETHASONE DIPROPIONATE: 10; .5 CREAM TOPICAL at 21:49

## 2019-03-14 RX ADMIN — OXCARBAZEPINE 300 MG: 300 TABLET, FILM COATED ORAL at 21:50

## 2019-03-14 RX ADMIN — NICOTINE POLACRILEX 4 MG: 4 GUM, CHEWING BUCCAL at 17:07

## 2019-03-14 RX ADMIN — RISPERIDONE 2 MG: 1 TABLET, FILM COATED ORAL at 21:50

## 2019-03-14 RX ADMIN — Medication 100 MG: at 08:27

## 2019-03-14 RX ADMIN — SIMETHICONE CHEW TAB 80 MG 80 MG: 80 TABLET ORAL at 21:50

## 2019-03-14 RX ADMIN — HYDROXYZINE PAMOATE 50 MG: 25 CAPSULE ORAL at 21:49

## 2019-03-14 RX ADMIN — OXCARBAZEPINE 300 MG: 300 TABLET, FILM COATED ORAL at 08:27

## 2019-03-14 RX ADMIN — LAMOTRIGINE 25 MG: 25 TABLET ORAL at 08:28

## 2019-03-14 RX ADMIN — NICOTINE POLACRILEX 4 MG: 4 GUM, CHEWING BUCCAL at 12:52

## 2019-03-14 RX ADMIN — PANTOPRAZOLE SODIUM 40 MG: 40 TABLET, DELAYED RELEASE ORAL at 08:28

## 2019-03-14 RX ADMIN — RISPERIDONE 2 MG: 1 TABLET, FILM COATED ORAL at 08:27

## 2019-03-14 RX ADMIN — SIMETHICONE CHEW TAB 80 MG 80 MG: 80 TABLET ORAL at 17:08

## 2019-03-14 RX ADMIN — NICOTINE POLACRILEX 4 MG: 4 GUM, CHEWING BUCCAL at 10:57

## 2019-03-14 NOTE — PROGRESS NOTES
The patient is showing slow improvement but continues to exhibit inappropriate behavior, frequently breaking wind at most inappropriate times.  I will add simethicone in hopes of addressing this unfortunate behavior.  Additionally, it is my feeling that given the patient's history of spotty medication compliance that continued use of Lamictal is contraindicated.  As the patient has been started on Trileptal, I will discontinue Lamictal today.

## 2019-03-14 NOTE — CONSULTS
"Reviewed chart and interviewed pt: Pt states he is a \"weekend drunk only\" \"I smoke weed and drink that's all\" Pt said that he drinks \"18 to 20 beers on weekend days\" But not every day\" Educated pt TREVOR is a disease of the brain and genetics play a role in TREVOR and if not genetic susceptibility person can drink ETOH for length of use, high amounts and frequently can rewire their brain developing the disease. Pt sat up and positioned himself in this therapist face and said he \"I do not believe that its is genetic\" \"He started to educate this therapist why it was not genetic\" De-escalate pt.  Agreed that genetics make one more vunerable and pt could accept. Educated pt that ETOH make psychiatric med's less effective, and increases depression. Educated pt that MJ is a addictive as any other substance of abuse. Pt disagreed. Pt stated he was not interested in any TREVOR treatment or resources. Encouraged pt to consider treatment and going to recovery meetings. Gave him AA directory and info on [facility], [facility] are alternatives to 12 step. Suggested that he could use a sober network individuals in recovery and treatment would not hurt him. Pt took information.    After speaking to another individual, pt walked by this therapist and threw down the material on the couch I had left with him and walked to his room.   "

## 2019-03-14 NOTE — PLAN OF CARE
Problem: Patient Care Overview  Goal: Plan of Care Review  Outcome: Ongoing (interventions implemented as appropriate)   03/14/19 1454   OTHER   Outcome Summary Patient has been compliant with groups and other activities. Patient refused AM dose of blood pressure medication stating he didn't want to be on a lot of medication and he didn't have blood pressure. Patient's blood pressure reading were reviewed with patient in an attempt to get him to take his medication and continued to refuse. Patieent has been appropriate with peers.   Plan of Care Review   Progress improving   Coping/Psychosocial   Plan of Care Reviewed With patient   Coping/Psychosocial   Patient Agreement with Plan of Care agrees       Problem: Overarching Goals (Adult)  Goal: Optimized Coping Skills in Response to Life Stressors    Intervention: Promote Effective Coping Strategies   03/14/19 1454   Coping/Psychosocial Interventions   Supportive Measures active listening utilized;decision-making supported;self-care encouraged;self-responsibility promoted;verbalization of feelings encouraged         Problem: Suicidal Behavior (Adult)  Intervention: Provide Immediate/Ongoing Protective Physical Environment   03/14/19 1454   Provide Immediate/Ongoing Protective Physical Environment   Mutually Determined Action Steps (Provide Immediate/Ongoing Protective Physical Environment) verbalizes safety check rationale;shares suicidal thoughts

## 2019-03-14 NOTE — PROGRESS NOTES
Daily progress note    Chief complaint   Doing better  No new complaints  Rash much improved with Lotrisone cream    History of present illness  43-year-old white male with history of hypertension bipolar disorder alcohol abuse tobacco abuse admitted to inpatient psych unit with severe depression and suicidal ideation.  I am asked to follow the patient for medical problem and with a history and physical.  At the time of interview he still thinking about suicide but he has never committed suicide and he still depressed.  Patient denies any headache dizziness chest pain shortness of breath abdominal pain nausea vomiting diarrhea.  Patient also denies any fever chills night sweats weight loss or weight gain.     REVIEW OF SYSTEMS  Review of Systems   Constitutional: Negative for fever.   Respiratory: Negative for shortness of breath.    Cardiovascular: Negative for chest pain.   Genitourinary: Negative for dysuria.   Neurological: Negative for light-headedness.   Psychiatric/Behavioral: Positive for sleep disturbance (insomnia) and suicidal ideas. The patient is nervous/anxious.         + depressed mood       PHYSICAL EXAM  Blood pressure 131/90, pulse 100, temperature 97.9 °F (36.6 °C), temperature source Oral, resp. rate 16, SpO2 97 %.    Constitutional: No distress.   Head: Normocephalic and atraumatic.   Eyes: EOM are normal.   Neck: Normal range of motion.   Pulmonary/Chest: No respiratory distress.   Abdominal: There is no tenderness.   Musculoskeletal: He exhibits no edema.   Neurological: He is alert.   Skin: Skin is warm and dry.   Psychiatric: He has a flat affect.     LAB RESULTS  Lab Results (last 24 hours)     ** No results found for the last 24 hours. **        Imaging Results (last 24 hours)     ** No results found for the last 24 hours. **          Current Facility-Administered Medications:   •  acetaminophen (TYLENOL) tablet 650 mg, 650 mg, Oral, Q4H PRN, Arley Moreno III, MD, 650 mg at  03/12/19 0903  •  aluminum-magnesium hydroxide-simethicone (MAALOX MAX) 400-400-40 MG/5ML suspension 15 mL, 15 mL, Oral, Q6H PRN, Arley Moreno III, MD  •  amLODIPine (NORVASC) tablet 10 mg, 10 mg, Oral, Daily, Shelly Dumont MD, 10 mg at 03/13/19 0833  •  buPROPion XL (WELLBUTRIN XL) 24 hr tablet 150 mg, 150 mg, Oral, Daily, Shelly Dumont MD, 150 mg at 03/13/19 0832  •  clotrimazole-betamethasone (LOTRISONE) 1-0.05 % cream, , Topical, Q12H, Shelly Dumont MD  •  hydrOXYzine pamoate (VISTARIL) capsule 50 mg, 50 mg, Oral, Q6H PRN, Arley Moreno III, MD, 50 mg at 03/13/19 2226  •  magnesium hydroxide (MILK OF MAGNESIA) suspension 2400 mg/10mL 10 mL, 10 mL, Oral, Daily PRN, Arley Moreno III, MD  •  nicotine polacrilex (NICORETTE) gum 4 mg, 4 mg, Mouth/Throat, Q2H PRN, Blaine Miranda MD, 4 mg at 03/14/19 1057  •  OXcarbazepine (TRILEPTAL) tablet 300 mg, 300 mg, Oral, BID, Arley Moreno III, MD, 300 mg at 03/14/19 0827  •  pantoprazole (PROTONIX) EC tablet 40 mg, 40 mg, Oral, Q AM, Shelly Dumont MD, 40 mg at 03/14/19 0828  •  risperiDONE (risperDAL) tablet 2 mg, 2 mg, Oral, BID, Arley Moreno III, MD, 2 mg at 03/14/19 0827  •  simethicone (MYLICON) chewable tablet 80 mg, 80 mg, Oral, 4x Daily, Arley Moreno III, MD  •  thiamine (VITAMIN B-1) tablet 100 mg, 100 mg, Oral, Daily, Shelly Dumont MD, 100 mg at 03/14/19 0827     ASSESSMENT  Severe depression with suicidal ideation  RASH bilateral inner thigh improving  Bipolar disorder  Anxiety disorder  Depression  Hypertension  Tobacco abuse  Alcohol abuse  Gastroesophageal reflux disease    PLAN  CPM  Home medications   Lotrisone cream twice daily for total 7 days  Stress ulcer prophylaxis  Supportive care  Medically stable  Will see as needed    SHELLY DUMONT MD

## 2019-03-14 NOTE — PLAN OF CARE
Problem: Patient Care Overview  Goal: Plan of Care Review  Outcome: Ongoing (interventions implemented as appropriate)   03/14/19 0217 03/14/19 0424   OTHER   Outcome Summary --  Pt has been compliant with care and medications. Affect remains flat, but mannerisms not as bizarre as noted to be previously. Sat in the lounge with peers and was appropriate. Will continue to monitor.    Plan of Care Review   Progress --  no change   Coping/Psychosocial   Plan of Care Reviewed With patient --    Coping/Psychosocial   Patient Agreement with Plan of Care agrees --        Problem: Overarching Goals (Adult)  Goal: Adheres to Safety Considerations for Self and Others  Outcome: Ongoing (interventions implemented as appropriate)   03/14/19 0424   Overarching Goals (Adult)   Adheres to Safety Considerations for Self and Others making progress toward outcome     Intervention: Develop and Maintain Individualized Safety Plan   03/14/19 0217 03/14/19 0400   C-SSRS (Recent)   Wish to be Dead no --    Suicidal Thoughts no --    Suicidal Thought with Method No Plan/Intent no --    Suicidal Intent (without Specific Plan) no --    Suicide Intent with Specific Plan no --    Describe Plan (Suicide Intent) no --    Suicide Behavior no --    Violence Risk   Feels Like Hurting Others no --    Previous Attempt to Harm Others no --    Develop and Maintain Individualized Safety Plan   Safety Measures --  safety rounds completed       Goal: Optimized Coping Skills in Response to Life Stressors  Outcome: Ongoing (interventions implemented as appropriate)   03/14/19 0424   Overarching Goals (Adult)   Optimized Coping Skills in Response to Life Stressors making progress toward outcome     Intervention: Promote Effective Coping Strategies   03/14/19 0217   Coping/Psychosocial Interventions   Supportive Measures active listening utilized;verbalization of feelings encouraged       Goal: Develops/Participates in Therapeutic [place] to Support Successful  Transition  Outcome: Ongoing (interventions implemented as appropriate)   03/14/19 0424   Overarching Goals (Adult)   Develops/Participates in Therapeutic [place] to Support Successful Transition making progress toward outcome     Intervention: Foster Therapeutic [place]   03/14/19 0217   Interventions   Trust Relationship/Rapport care explained;thoughts/feelings acknowledged     Intervention: Mutually Develop Transition Plan   03/14/19 0217   Mutually Develop Transition Plan   Transition Support crisis management plan promoted         Problem: Suicidal Behavior (Adult)  Goal: Suicidal Behavior is Absent/Minimized/Managed  Outcome: Ongoing (interventions implemented as appropriate)   03/14/19 0424   Suicidal Behavior is Absent/Minimized/Managed   Suicidal Behavior Managed/Minimized Time Frame for Action Step (STG) 1 day   Suicidal Behavior Managed/Minimized Action Step (STG) Outcome making progress toward outcome

## 2019-03-14 NOTE — CONSULTS
Met with patient to discuss issues with alcohol after reviewing his chart.  Alcohol is his drug of choice, had his first drink at 11.  Described himself as a social drinker until after his divorce in 2013 when he stayed drunk for a year and a half.  He has never stopped completely but does not drink as much.  Had 6 beers prior to his admission to the hospital.  Patient also smokes marijuana-usually when a friend has it-once a week, sometimes monthly.  He does not see this as problematic.  He does, however, acknowledge a problem with gambling and states that [place] has [place] over [place] the boat.  Patient states a willingness to work on his addictive behaviors but does not think he needs help doing this.  He said he has gone to AA with a relative and a lot of those people were drinking in the parking lot.  Encouraged him to think about the impact on his health.

## 2019-03-15 VITALS
SYSTOLIC BLOOD PRESSURE: 140 MMHG | OXYGEN SATURATION: 94 % | TEMPERATURE: 96.7 F | HEART RATE: 99 BPM | DIASTOLIC BLOOD PRESSURE: 95 MMHG | RESPIRATION RATE: 16 BRPM

## 2019-03-15 RX ORDER — OXCARBAZEPINE 300 MG/1
300 TABLET, FILM COATED ORAL 2 TIMES DAILY
Qty: 60 TABLET | Refills: 1 | Status: SHIPPED | OUTPATIENT
Start: 2019-03-15 | End: 2019-03-29 | Stop reason: HOSPADM

## 2019-03-15 RX ORDER — RISPERIDONE 2 MG/1
2 TABLET ORAL 2 TIMES DAILY
Qty: 60 TABLET | Refills: 1 | Status: SHIPPED | OUTPATIENT
Start: 2019-03-15 | End: 2019-03-29 | Stop reason: HOSPADM

## 2019-03-15 RX ORDER — HYDROXYZINE PAMOATE 50 MG/1
50 CAPSULE ORAL EVERY 6 HOURS PRN
Qty: 80 CAPSULE | Refills: 1 | Status: SHIPPED | OUTPATIENT
Start: 2019-03-15 | End: 2019-03-29 | Stop reason: HOSPADM

## 2019-03-15 RX ADMIN — OXCARBAZEPINE 300 MG: 300 TABLET, FILM COATED ORAL at 08:43

## 2019-03-15 RX ADMIN — RISPERIDONE 2 MG: 1 TABLET, FILM COATED ORAL at 08:43

## 2019-03-15 RX ADMIN — AMLODIPINE BESYLATE 10 MG: 10 TABLET ORAL at 08:43

## 2019-03-15 RX ADMIN — Medication 100 MG: at 08:43

## 2019-03-15 RX ADMIN — PANTOPRAZOLE SODIUM 40 MG: 40 TABLET, DELAYED RELEASE ORAL at 08:43

## 2019-03-15 NOTE — DISCHARGE SUMMARY
DATES OF ADMISSION: 3/10/2019-3/15/2019    REASON FOR ADMISSION: The patient is a 43-year-old white male admitted in a manic phase of bipolar disorder    LABS: See chart    HOSPITAL COURSE: The patient was admitted to the crisis management unit.  He was continued on previously prescribed medications including Lamictal and Risperdal.  Risperdal was titrated to final dose of 2 mg twice daily.  A decision was made by this physician to discontinue Lamictal given the patient's history of sporadic compliance with medication.  In its place, the patient was started on Trileptal 300 mg twice daily.  He tolerated the medication well.  After exhibiting some bizarre and often of noxious behavior, the patient calmed considerably by 3/14/2019.  He was pleasant cooperative in his interactions with peers and staff and appeared to return to the psych to baseline.  Discharge was ordered.    FINAL DIAGNOSIS: Bipolar disorder most recent episode manic, hypertension, GERD    DISPOSITION ON DISCHARGE: A full listing of the patient's medications is provided below.  The patient will follow up with his previous treating psychiatrist Dr. Chaz Mtz.    PROGNOSIS: Fair       Your medication list      START taking these medications      Instructions Last Dose Given Next Dose Due   hydrOXYzine pamoate 50 MG capsule  Commonly known as:  VISTARIL      Take 1 capsule by mouth Every 6 (Six) Hours As Needed for Anxiety.       nicotine polacrilex 4 MG gum  Commonly known as:  NICORETTE      Chew 1 each Every 2 (Two) Hours As Needed for Smoking Cessation.       OXcarbazepine 300 MG tablet  Commonly known as:  TRILEPTAL      Take 1 tablet by mouth 2 (Two) Times a Day.          CHANGE how you take these medications      Instructions Last Dose Given Next Dose Due   risperiDONE 2 MG tablet  Commonly known as:  risperDAL  What changed:    · medication strength  · how much to take  · when to take this      Take 1 tablet by mouth 2 (Two) Times a Day.           CONTINUE taking these medications      Instructions Last Dose Given Next Dose Due   amLODIPine 5 MG tablet  Commonly known as:  NORVASC      Take 5 mg by mouth daily.       buPROPion  MG 24 hr tablet  Commonly known as:  WELLBUTRIN XL      Take 150 mg by mouth daily.          STOP taking these medications    lamoTRIgine 25 MG tablet  Commonly known as:  LaMICtal        OLANZapine 5 MG tablet  Commonly known as:  zyPREXA              Where to Get Your Medications      You can get these medications from any pharmacy    Bring a paper prescription for each of these medications  · hydrOXYzine pamoate 50 MG capsule  · nicotine polacrilex 4 MG gum  · OXcarbazepine 300 MG tablet  · risperiDONE 2 MG tablet

## 2019-03-15 NOTE — PROGRESS NOTES
Continued Stay Note  [facility]     Patient Name: Lang Corbett  MRN: [id number]  Today's Date: 3/15/2019    Admit Date: 3/10/2019    Discharge Plan     Row Name 03/15/19 1153       Plan    Final Discharge Disposition Code  01 - home or self-care    Final Note  Pt will be discharged per Dr. Moreno's orders.  SW met w/pt to discuss follow-up care.  Pt receives svcs through [facility], Dr. Mtz, Psychiatrist.  Pt has an appt on 3/22@11:30 am.  Pt is being transported home by a family member.        Discharge Codes    No documentation.       Expected Discharge Date and Time     Expected Discharge Date Expected Discharge Time    Mar 15, 2019             OLIVIER Hilario

## 2019-03-15 NOTE — PLAN OF CARE
"Problem: Patient Care Overview  Goal: Plan of Care Review  Outcome: Ongoing (interventions implemented as appropriate)   03/14/19 1454 03/15/19 0000 03/15/19 0419   OTHER   Outcome Summary --  --  Pt was compliant with all medications this shift and remains focused on the Risperdal \"because that is what I use to help me sleep\". Pt spent time in the dayroom with peers watching TV and was pleasant and appropriate. Pt rates anxiety 3/10 and denies depression, SI, HI and AVH. Pt reports that he is feeling better and ready for discharge. Pt slept most of the evening. 3/15/19 0429     Plan of Care Review   Progress improving --  --    Coping/Psychosocial   Plan of Care Reviewed With --  patient --    Coping/Psychosocial   Patient Agreement with Plan of Care --  agrees --        Problem: Overarching Goals (Adult)  Goal: Adheres to Safety Considerations for Self and Others  Outcome: Ongoing (interventions implemented as appropriate)   03/15/19 0419   Overarching Goals (Adult)   Adheres to Safety Considerations for Self and Others making progress toward outcome     Goal: Optimized Coping Skills in Response to Life Stressors  Outcome: Ongoing (interventions implemented as appropriate)   03/15/19 0419   Overarching Goals (Adult)   Optimized Coping Skills in Response to Life Stressors making progress toward outcome     Goal: Develops/Participates in Therapeutic [place] to Support Successful Transition  Outcome: Ongoing (interventions implemented as appropriate)   03/15/19 0419   Overarching Goals (Adult)   Develops/Participates in Therapeutic [place] to Support Successful Transition making progress toward outcome       Problem: Suicidal Behavior (Adult)  Goal: Suicidal Behavior is Absent/Minimized/Managed  Outcome: Ongoing (interventions implemented as appropriate)   03/15/19 0419   Suicidal Behavior is Absent/Minimized/Managed   Suicidal Behavior Managed/Minimized Action Step (STG) Outcome making progress toward outcome "

## 2019-03-15 NOTE — PROGRESS NOTES
Continued Stay Note  [facility]     Patient Name: Lang Corbett  MRN: [id number]  Today's Date: 3/15/2019    Admit Date: 3/10/2019    Discharge Plan     Row Name 03/15/19 0846       Plan    Plan Comments  On 3/14, SW met w/pt to discuss follow-up care.  Pt stated that he does have a Psychiatrist, Dr. Mtz at [facility] but does not see a Therapist.  Pt stated that he would like to see a mental health therapist.  SW advised that an appt could be set up for him upon d/c.        Discharge Codes    No documentation.             OLIVIER Hilario

## 2019-03-25 ENCOUNTER — HOSPITAL ENCOUNTER (EMERGENCY)
Facility: HOSPITAL | Age: 44
Discharge: PSYCHIATRIC OR SUBSTANCE USE FACILITY | End: 2019-03-25
Attending: EMERGENCY MEDICINE | Admitting: NURSE PRACTITIONER

## 2019-03-25 ENCOUNTER — HOSPITAL ENCOUNTER (INPATIENT)
Facility: HOSPITAL | Age: 44
LOS: 4 days | Discharge: HOME | End: 2019-03-29
Attending: SPECIALIST | Admitting: SPECIALIST

## 2019-03-25 VITALS
WEIGHT: 280 LBS | HEIGHT: 72 IN | DIASTOLIC BLOOD PRESSURE: 113 MMHG | BODY MASS INDEX: 37.93 KG/M2 | SYSTOLIC BLOOD PRESSURE: 181 MMHG | OXYGEN SATURATION: 99 % | HEART RATE: 103 BPM | RESPIRATION RATE: 18 BRPM | TEMPERATURE: 98.1 F

## 2019-03-25 DIAGNOSIS — R45.851 DEPRESSION WITH SUICIDAL IDEATION: Primary | ICD-10-CM

## 2019-03-25 DIAGNOSIS — F32.A DEPRESSION WITH SUICIDAL IDEATION: Primary | ICD-10-CM

## 2019-03-25 PROBLEM — F31.60 BIPOLAR DISORDER, CURRENT EPISODE MIXED (HCC): Status: ACTIVE | Noted: 2019-03-25

## 2019-03-25 LAB
ALBUMIN SERPL-MCNC: 4.8 G/DL (ref 3.5–5.2)
ALBUMIN/GLOB SERPL: 1.6 G/DL
ALP SERPL-CCNC: 83 U/L (ref 39–117)
ALT SERPL W P-5'-P-CCNC: 34 U/L (ref 1–41)
AMPHET+METHAMPHET UR QL: NEGATIVE
ANION GAP SERPL CALCULATED.3IONS-SCNC: 14.1 MMOL/L
ANION GAP SERPL CALCULATED.3IONS-SCNC: 19.8 MMOL/L
AST SERPL-CCNC: 22 U/L (ref 1–40)
BARBITURATES UR QL SCN: NEGATIVE
BENZODIAZ UR QL SCN: NEGATIVE
BILIRUB SERPL-MCNC: 0.3 MG/DL (ref 0.2–1.2)
BUN BLD-MCNC: 10 MG/DL (ref 6–20)
BUN BLD-MCNC: 9 MG/DL (ref 6–20)
BUN/CREAT SERPL: 12.5 (ref 7–25)
BUN/CREAT SERPL: 15.4 (ref 7–25)
CALCIUM SPEC-SCNC: 9.2 MG/DL (ref 8.6–10.5)
CALCIUM SPEC-SCNC: 9.5 MG/DL (ref 8.6–10.5)
CANNABINOIDS SERPL QL: NEGATIVE
CHLORIDE SERPL-SCNC: 102 MMOL/L (ref 98–107)
CHLORIDE SERPL-SCNC: 99 MMOL/L (ref 98–107)
CO2 SERPL-SCNC: 22.2 MMOL/L (ref 22–29)
CO2 SERPL-SCNC: 25.9 MMOL/L (ref 22–29)
COCAINE UR QL: NEGATIVE
CREAT BLD-MCNC: 0.65 MG/DL (ref 0.76–1.27)
CREAT BLD-MCNC: 0.72 MG/DL (ref 0.76–1.27)
ETHANOL BLD-MCNC: <10 MG/DL (ref 0–10)
ETHANOL UR QL: <0.01 %
GFR SERPL CREATININE-BSD FRML MDRD: 118 ML/MIN/1.73
GFR SERPL CREATININE-BSD FRML MDRD: 133 ML/MIN/1.73
GLOBULIN UR ELPH-MCNC: 3 GM/DL
GLUCOSE BLD-MCNC: 145 MG/DL (ref 65–99)
GLUCOSE BLD-MCNC: 83 MG/DL (ref 65–99)
METHADONE UR QL SCN: NEGATIVE
OPIATES UR QL: NEGATIVE
OXYCODONE UR QL SCN: NEGATIVE
POTASSIUM BLD-SCNC: 3.8 MMOL/L (ref 3.5–5.2)
POTASSIUM BLD-SCNC: 4.1 MMOL/L (ref 3.5–5.2)
PROT SERPL-MCNC: 7.8 G/DL (ref 6–8.5)
SODIUM BLD-SCNC: 141 MMOL/L (ref 136–145)
SODIUM BLD-SCNC: 142 MMOL/L (ref 136–145)

## 2019-03-25 PROCEDURE — 80053 COMPREHEN METABOLIC PANEL: CPT | Performed by: SPECIALIST

## 2019-03-25 PROCEDURE — 93010 ELECTROCARDIOGRAM REPORT: CPT | Performed by: INTERNAL MEDICINE

## 2019-03-25 PROCEDURE — 80307 DRUG TEST PRSMV CHEM ANLYZR: CPT | Performed by: NURSE PRACTITIONER

## 2019-03-25 PROCEDURE — 99283 EMERGENCY DEPT VISIT LOW MDM: CPT

## 2019-03-25 PROCEDURE — 93005 ELECTROCARDIOGRAM TRACING: CPT | Performed by: NURSE PRACTITIONER

## 2019-03-25 PROCEDURE — 90791 PSYCH DIAGNOSTIC EVALUATION: CPT | Performed by: SOCIAL WORKER

## 2019-03-25 RX ORDER — ALUMINA, MAGNESIA, AND SIMETHICONE 2400; 2400; 240 MG/30ML; MG/30ML; MG/30ML
15 SUSPENSION ORAL EVERY 6 HOURS PRN
Status: DISCONTINUED | OUTPATIENT
Start: 2019-03-25 | End: 2019-03-29 | Stop reason: HOSPADM

## 2019-03-25 RX ORDER — HYDROXYZINE PAMOATE 50 MG/1
50 CAPSULE ORAL ONCE
Status: COMPLETED | OUTPATIENT
Start: 2019-03-25 | End: 2019-03-25

## 2019-03-25 RX ORDER — OXCARBAZEPINE 300 MG/1
300 TABLET, FILM COATED ORAL 2 TIMES DAILY
Status: COMPLETED | OUTPATIENT
Start: 2019-03-25 | End: 2019-03-26

## 2019-03-25 RX ORDER — RISPERIDONE 1 MG/1
2 TABLET ORAL EVERY 12 HOURS SCHEDULED
Status: COMPLETED | OUTPATIENT
Start: 2019-03-25 | End: 2019-03-26

## 2019-03-25 RX ORDER — ALPRAZOLAM 0.25 MG/1
1 TABLET ORAL ONCE
Status: COMPLETED | OUTPATIENT
Start: 2019-03-25 | End: 2019-03-25

## 2019-03-25 RX ORDER — AMLODIPINE BESYLATE 5 MG/1
5 TABLET ORAL
Status: DISCONTINUED | OUTPATIENT
Start: 2019-03-25 | End: 2019-03-29 | Stop reason: HOSPADM

## 2019-03-25 RX ORDER — HYDROXYZINE 50 MG/1
50 TABLET, FILM COATED ORAL EVERY 4 HOURS PRN
Status: DISCONTINUED | OUTPATIENT
Start: 2019-03-25 | End: 2019-03-29 | Stop reason: HOSPADM

## 2019-03-25 RX ORDER — ACETAMINOPHEN 325 MG/1
650 TABLET ORAL EVERY 4 HOURS PRN
Status: DISCONTINUED | OUTPATIENT
Start: 2019-03-25 | End: 2019-03-29 | Stop reason: HOSPADM

## 2019-03-25 RX ADMIN — HYDROXYZINE PAMOATE 50 MG: 50 CAPSULE ORAL at 13:09

## 2019-03-25 RX ADMIN — RISPERIDONE 2 MG: 1 TABLET, FILM COATED ORAL at 21:31

## 2019-03-25 RX ADMIN — ALPRAZOLAM 1 MG: 0.25 TABLET ORAL at 15:11

## 2019-03-25 RX ADMIN — HYDROXYZINE HYDROCHLORIDE 50 MG: 50 TABLET ORAL at 20:47

## 2019-03-25 RX ADMIN — OXCARBAZEPINE 300 MG: 300 TABLET, FILM COATED ORAL at 21:31

## 2019-03-25 RX ADMIN — AMLODIPINE BESYLATE 5 MG: 5 TABLET ORAL at 21:31

## 2019-03-26 RX ORDER — OXCARBAZEPINE 300 MG/1
300 TABLET, FILM COATED ORAL 2 TIMES DAILY
Status: DISCONTINUED | OUTPATIENT
Start: 2019-03-26 | End: 2019-03-29 | Stop reason: HOSPADM

## 2019-03-26 RX ORDER — SIMETHICONE 80 MG
80 TABLET,CHEWABLE ORAL 3 TIMES DAILY
Status: DISCONTINUED | OUTPATIENT
Start: 2019-03-26 | End: 2019-03-29 | Stop reason: HOSPADM

## 2019-03-26 RX ORDER — OXCARBAZEPINE 300 MG/1
600 TABLET, FILM COATED ORAL 2 TIMES DAILY
Status: DISCONTINUED | OUTPATIENT
Start: 2019-03-26 | End: 2019-03-26

## 2019-03-26 RX ORDER — RISPERIDONE 1 MG/1
3 TABLET ORAL EVERY 12 HOURS SCHEDULED
Status: DISCONTINUED | OUTPATIENT
Start: 2019-03-26 | End: 2019-03-29 | Stop reason: HOSPADM

## 2019-03-26 RX ADMIN — RISPERIDONE 2 MG: 1 TABLET, FILM COATED ORAL at 08:59

## 2019-03-26 RX ADMIN — SIMETHICONE CHEW TAB 80 MG 80 MG: 80 TABLET ORAL at 12:36

## 2019-03-26 RX ADMIN — OXCARBAZEPINE 300 MG: 300 TABLET, FILM COATED ORAL at 22:09

## 2019-03-26 RX ADMIN — AMLODIPINE BESYLATE 5 MG: 5 TABLET ORAL at 08:59

## 2019-03-26 RX ADMIN — OXCARBAZEPINE 300 MG: 300 TABLET, FILM COATED ORAL at 08:59

## 2019-03-26 RX ADMIN — SIMETHICONE CHEW TAB 80 MG 80 MG: 80 TABLET ORAL at 16:38

## 2019-03-26 RX ADMIN — NICOTINE POLACRILEX 4 MG: 4 GUM, CHEWING BUCCAL at 18:34

## 2019-03-26 RX ADMIN — RISPERIDONE 3 MG: 1 TABLET, FILM COATED ORAL at 22:09

## 2019-03-27 RX ADMIN — SIMETHICONE CHEW TAB 80 MG 80 MG: 80 TABLET ORAL at 21:17

## 2019-03-27 RX ADMIN — HYDROXYZINE HYDROCHLORIDE 50 MG: 50 TABLET ORAL at 21:18

## 2019-03-27 RX ADMIN — OXCARBAZEPINE 300 MG: 300 TABLET, FILM COATED ORAL at 21:17

## 2019-03-27 RX ADMIN — RISPERIDONE 3 MG: 1 TABLET, FILM COATED ORAL at 21:17

## 2019-03-27 RX ADMIN — AMLODIPINE BESYLATE 5 MG: 5 TABLET ORAL at 08:04

## 2019-03-27 RX ADMIN — OXCARBAZEPINE 300 MG: 300 TABLET, FILM COATED ORAL at 08:05

## 2019-03-27 RX ADMIN — NICOTINE POLACRILEX 4 MG: 4 GUM, CHEWING BUCCAL at 21:18

## 2019-03-27 RX ADMIN — SIMETHICONE CHEW TAB 80 MG 80 MG: 80 TABLET ORAL at 17:56

## 2019-03-27 RX ADMIN — RISPERIDONE 3 MG: 1 TABLET, FILM COATED ORAL at 08:05

## 2019-03-27 RX ADMIN — SIMETHICONE CHEW TAB 80 MG 80 MG: 80 TABLET ORAL at 08:04

## 2019-03-28 PROCEDURE — 25010000002 RISPERIDONE MICROSPHERES PER 0.5 MG: Performed by: SPECIALIST

## 2019-03-28 RX ADMIN — SIMETHICONE CHEW TAB 80 MG 80 MG: 80 TABLET ORAL at 09:02

## 2019-03-28 RX ADMIN — OXCARBAZEPINE 300 MG: 300 TABLET, FILM COATED ORAL at 09:02

## 2019-03-28 RX ADMIN — NICOTINE POLACRILEX 4 MG: 4 GUM, CHEWING BUCCAL at 16:14

## 2019-03-28 RX ADMIN — RISPERIDONE 3 MG: 1 TABLET, FILM COATED ORAL at 09:02

## 2019-03-28 RX ADMIN — AMLODIPINE BESYLATE 5 MG: 5 TABLET ORAL at 09:02

## 2019-03-28 RX ADMIN — OXCARBAZEPINE 300 MG: 300 TABLET, FILM COATED ORAL at 21:59

## 2019-03-28 RX ADMIN — SIMETHICONE CHEW TAB 80 MG 80 MG: 80 TABLET ORAL at 21:59

## 2019-03-28 RX ADMIN — SIMETHICONE CHEW TAB 80 MG 80 MG: 80 TABLET ORAL at 16:15

## 2019-03-28 RX ADMIN — RISPERIDONE 25 MG: KIT at 12:18

## 2019-03-28 RX ADMIN — RISPERIDONE 3 MG: 1 TABLET, FILM COATED ORAL at 21:59

## 2019-03-29 VITALS
BODY MASS INDEX: 37.93 KG/M2 | DIASTOLIC BLOOD PRESSURE: 74 MMHG | HEART RATE: 84 BPM | HEIGHT: 72 IN | WEIGHT: 280 LBS | TEMPERATURE: 96.7 F | RESPIRATION RATE: 18 BRPM | OXYGEN SATURATION: 96 % | SYSTOLIC BLOOD PRESSURE: 124 MMHG

## 2019-03-29 RX ORDER — HALOPERIDOL 5 MG/ML
10 INJECTION INTRAMUSCULAR ONCE
Status: DISCONTINUED | OUTPATIENT
Start: 2019-03-29 | End: 2019-03-29

## 2019-03-29 RX ORDER — RISPERIDONE 3 MG/1
3 TABLET ORAL EVERY 12 HOURS SCHEDULED
Qty: 42 TABLET | Refills: 0 | Status: SHIPPED | OUTPATIENT
Start: 2019-03-29

## 2019-03-29 RX ORDER — DIPHENHYDRAMINE HYDROCHLORIDE 50 MG/ML
50 INJECTION INTRAMUSCULAR; INTRAVENOUS ONCE
Status: DISCONTINUED | OUTPATIENT
Start: 2019-03-29 | End: 2019-03-29

## 2019-03-29 RX ORDER — OXCARBAZEPINE 300 MG/1
300 TABLET, FILM COATED ORAL 2 TIMES DAILY
Qty: 60 TABLET | Refills: 1 | Status: SHIPPED | OUTPATIENT
Start: 2019-03-29

## 2019-03-29 RX ORDER — HYDROXYZINE 50 MG/1
50 TABLET, FILM COATED ORAL EVERY 4 HOURS PRN
Qty: 75 TABLET | Refills: 0 | Status: SHIPPED | OUTPATIENT
Start: 2019-03-29

## 2019-03-29 RX ADMIN — AMLODIPINE BESYLATE 5 MG: 5 TABLET ORAL at 08:39

## 2019-03-29 RX ADMIN — SIMETHICONE CHEW TAB 80 MG 80 MG: 80 TABLET ORAL at 08:40

## 2019-03-29 RX ADMIN — NICOTINE POLACRILEX 4 MG: 4 GUM, CHEWING BUCCAL at 08:40

## 2019-03-29 RX ADMIN — RISPERIDONE 3 MG: 1 TABLET, FILM COATED ORAL at 08:39

## 2019-03-29 RX ADMIN — OXCARBAZEPINE 300 MG: 300 TABLET, FILM COATED ORAL at 08:39

## 2019-04-01 ENCOUNTER — TELEPHONE (OUTPATIENT)
Dept: PSYCHIATRY | Facility: HOSPITAL | Age: 44
End: 2019-04-01